# Patient Record
Sex: MALE | Race: WHITE | Employment: FULL TIME | URBAN - METROPOLITAN AREA
[De-identification: names, ages, dates, MRNs, and addresses within clinical notes are randomized per-mention and may not be internally consistent; named-entity substitution may affect disease eponyms.]

---

## 2024-08-19 ENCOUNTER — APPOINTMENT (OUTPATIENT)
Dept: CT IMAGING | Age: 69
DRG: 286 | End: 2024-08-19
Payer: MEDICARE

## 2024-08-19 ENCOUNTER — APPOINTMENT (OUTPATIENT)
Dept: GENERAL RADIOLOGY | Age: 69
DRG: 286 | End: 2024-08-19
Payer: MEDICARE

## 2024-08-19 ENCOUNTER — HOSPITAL ENCOUNTER (INPATIENT)
Age: 69
LOS: 2 days | Discharge: HOME | DRG: 286 | End: 2024-08-22
Attending: STUDENT IN AN ORGANIZED HEALTH CARE EDUCATION/TRAINING PROGRAM | Admitting: INTERNAL MEDICINE
Payer: MEDICARE

## 2024-08-19 DIAGNOSIS — I25.10 CAD (CORONARY ARTERY DISEASE): ICD-10-CM

## 2024-08-19 DIAGNOSIS — I50.9 ACUTE ON CHRONIC CONGESTIVE HEART FAILURE, UNSPECIFIED HEART FAILURE TYPE (HCC): Primary | ICD-10-CM

## 2024-08-19 DIAGNOSIS — I50.21 ACUTE SYSTOLIC CONGESTIVE HEART FAILURE (HCC): ICD-10-CM

## 2024-08-19 DIAGNOSIS — R06.02 SHORTNESS OF BREATH: ICD-10-CM

## 2024-08-19 LAB
ANION GAP SERPL CALCULATED.3IONS-SCNC: 18 MMOL/L (ref 3–16)
BASE EXCESS BLDV CALC-SCNC: -7.3 MMOL/L (ref -3–3)
BASOPHILS # BLD: 0.2 K/UL (ref 0–0.2)
BASOPHILS NFR BLD: 2.2 %
BUN SERPL-MCNC: 22 MG/DL (ref 7–20)
CALCIUM SERPL-MCNC: 8.7 MG/DL (ref 8.3–10.6)
CHLORIDE SERPL-SCNC: 96 MMOL/L (ref 99–110)
CO2 BLDV-SCNC: 39 MMOL/L
CO2 SERPL-SCNC: 15 MMOL/L (ref 21–32)
COHGB MFR BLDV: 7.5 % (ref 0–1.5)
CREAT SERPL-MCNC: 1.2 MG/DL (ref 0.8–1.3)
D-DIMER QUANTITATIVE: 2.13 UG/ML FEU (ref 0–0.6)
DEPRECATED RDW RBC AUTO: 15.1 % (ref 12.4–15.4)
EOSINOPHIL # BLD: 0.2 K/UL (ref 0–0.6)
EOSINOPHIL NFR BLD: 2 %
GFR SERPLBLD CREATININE-BSD FMLA CKD-EPI: 66 ML/MIN/{1.73_M2}
GLUCOSE SERPL-MCNC: 135 MG/DL (ref 70–99)
HCO3 BLDV-SCNC: 16.4 MMOL/L (ref 23–29)
HCT VFR BLD AUTO: 42.9 % (ref 40.5–52.5)
HGB BLD-MCNC: 13.9 G/DL (ref 13.5–17.5)
LYMPHOCYTES # BLD: 1.3 K/UL (ref 1–5.1)
LYMPHOCYTES NFR BLD: 15.1 %
MCH RBC QN AUTO: 27.3 PG (ref 26–34)
MCHC RBC AUTO-ENTMCNC: 32.4 G/DL (ref 31–36)
MCV RBC AUTO: 84.3 FL (ref 80–100)
METHGB MFR BLDV: 0.7 %
MONOCYTES # BLD: 0.9 K/UL (ref 0–1.3)
MONOCYTES NFR BLD: 10.6 %
NEUTROPHILS # BLD: 6 K/UL (ref 1.7–7.7)
NEUTROPHILS NFR BLD: 70.1 %
NT-PROBNP SERPL-MCNC: ABNORMAL PG/ML (ref 0–124)
O2 CT VFR BLDV CALC: 19 VOL %
O2 THERAPY: ABNORMAL
PCO2 BLDV: 28.1 MMHG (ref 40–50)
PH BLDV: 7.37 [PH] (ref 7.35–7.45)
PLATELET # BLD AUTO: 274 K/UL (ref 135–450)
PMV BLD AUTO: 8 FL (ref 5–10.5)
PO2 BLDV: 147 MMHG (ref 25–40)
POTASSIUM SERPL-SCNC: 4.7 MMOL/L (ref 3.5–5.1)
RBC # BLD AUTO: 5.09 M/UL (ref 4.2–5.9)
SAO2 % BLDV: 100 %
SODIUM SERPL-SCNC: 129 MMOL/L (ref 136–145)
TROPONIN, HIGH SENSITIVITY: 34 NG/L (ref 0–22)
WBC # BLD AUTO: 8.6 K/UL (ref 4–11)

## 2024-08-19 PROCEDURE — 83880 ASSAY OF NATRIURETIC PEPTIDE: CPT

## 2024-08-19 PROCEDURE — 96374 THER/PROPH/DIAG INJ IV PUSH: CPT

## 2024-08-19 PROCEDURE — 85025 COMPLETE CBC W/AUTO DIFF WBC: CPT

## 2024-08-19 PROCEDURE — 99285 EMERGENCY DEPT VISIT HI MDM: CPT

## 2024-08-19 PROCEDURE — 71046 X-RAY EXAM CHEST 2 VIEWS: CPT

## 2024-08-19 PROCEDURE — 82803 BLOOD GASES ANY COMBINATION: CPT

## 2024-08-19 PROCEDURE — 71260 CT THORAX DX C+: CPT

## 2024-08-19 PROCEDURE — 80048 BASIC METABOLIC PNL TOTAL CA: CPT

## 2024-08-19 PROCEDURE — 6360000004 HC RX CONTRAST MEDICATION: Performed by: STUDENT IN AN ORGANIZED HEALTH CARE EDUCATION/TRAINING PROGRAM

## 2024-08-19 PROCEDURE — 6360000002 HC RX W HCPCS: Performed by: STUDENT IN AN ORGANIZED HEALTH CARE EDUCATION/TRAINING PROGRAM

## 2024-08-19 PROCEDURE — 93005 ELECTROCARDIOGRAM TRACING: CPT | Performed by: EMERGENCY MEDICINE

## 2024-08-19 PROCEDURE — 84484 ASSAY OF TROPONIN QUANT: CPT

## 2024-08-19 PROCEDURE — 85379 FIBRIN DEGRADATION QUANT: CPT

## 2024-08-19 RX ORDER — IOPAMIDOL 755 MG/ML
75 INJECTION, SOLUTION INTRAVASCULAR
Status: COMPLETED | OUTPATIENT
Start: 2024-08-19 | End: 2024-08-19

## 2024-08-19 RX ORDER — FUROSEMIDE 10 MG/ML
40 INJECTION INTRAMUSCULAR; INTRAVENOUS ONCE
Status: COMPLETED | OUTPATIENT
Start: 2024-08-19 | End: 2024-08-19

## 2024-08-19 RX ADMIN — FUROSEMIDE 40 MG: 10 INJECTION, SOLUTION INTRAMUSCULAR; INTRAVENOUS at 23:01

## 2024-08-19 RX ADMIN — IOPAMIDOL 75 ML: 755 INJECTION, SOLUTION INTRAVENOUS at 23:56

## 2024-08-19 ASSESSMENT — LIFESTYLE VARIABLES
HOW OFTEN DO YOU HAVE A DRINK CONTAINING ALCOHOL: MONTHLY OR LESS
HOW MANY STANDARD DRINKS CONTAINING ALCOHOL DO YOU HAVE ON A TYPICAL DAY: 1 OR 2

## 2024-08-19 ASSESSMENT — PAIN - FUNCTIONAL ASSESSMENT: PAIN_FUNCTIONAL_ASSESSMENT: NONE - DENIES PAIN

## 2024-08-20 ENCOUNTER — APPOINTMENT (OUTPATIENT)
Age: 69
DRG: 286 | End: 2024-08-20
Attending: INTERNAL MEDICINE
Payer: MEDICARE

## 2024-08-20 PROBLEM — R06.02 SHORTNESS OF BREATH: Status: ACTIVE | Noted: 2024-08-20

## 2024-08-20 LAB
ANION GAP SERPL CALCULATED.3IONS-SCNC: 15 MMOL/L (ref 3–16)
BASOPHILS # BLD: 0 K/UL (ref 0–0.2)
BASOPHILS NFR BLD: 0.5 %
BUN SERPL-MCNC: 21 MG/DL (ref 7–20)
CALCIUM SERPL-MCNC: 8.5 MG/DL (ref 8.3–10.6)
CHLORIDE SERPL-SCNC: 99 MMOL/L (ref 99–110)
CO2 SERPL-SCNC: 20 MMOL/L (ref 21–32)
CREAT SERPL-MCNC: 1.3 MG/DL (ref 0.8–1.3)
DEPRECATED RDW RBC AUTO: 15.1 % (ref 12.4–15.4)
ECHO AO ASC DIAM: 3.6 CM
ECHO AO ASCENDING AORTA INDEX: 1.75 CM/M2
ECHO AO ROOT DIAM: 3.6 CM
ECHO AO ROOT INDEX: 1.75 CM/M2
ECHO AV AREA PEAK VELOCITY: 2.9 CM2
ECHO AV AREA VTI: 2.6 CM2
ECHO AV AREA/BSA PEAK VELOCITY: 1.4 CM2/M2
ECHO AV AREA/BSA VTI: 1.3 CM2/M2
ECHO AV MEAN GRADIENT: 1 MMHG
ECHO AV MEAN VELOCITY: 0.6 M/S
ECHO AV PEAK GRADIENT: 3 MMHG
ECHO AV PEAK VELOCITY: 0.8 M/S
ECHO AV VELOCITY RATIO: 0.88
ECHO AV VTI: 12.5 CM
ECHO BSA: 2.08 M2
ECHO EST RA PRESSURE: 15 MMHG
ECHO IVC PROX: 2.4 CM
ECHO LA AREA 2C: 27.3 CM2
ECHO LA AREA 4C: 28.1 CM2
ECHO LA DIAMETER INDEX: 2.33 CM/M2
ECHO LA DIAMETER: 4.8 CM
ECHO LA MAJOR AXIS: 6.6 CM
ECHO LA MINOR AXIS: 5.9 CM
ECHO LA TO AORTIC ROOT RATIO: 1.33
ECHO LA VOL BP: 103 ML (ref 18–58)
ECHO LA VOL MOD A2C: 102 ML (ref 18–58)
ECHO LA VOL MOD A4C: 94 ML (ref 18–58)
ECHO LA VOL/BSA BIPLANE: 50 ML/M2 (ref 16–34)
ECHO LA VOLUME INDEX MOD A2C: 50 ML/M2 (ref 16–34)
ECHO LA VOLUME INDEX MOD A4C: 46 ML/M2 (ref 16–34)
ECHO LV E' LATERAL VELOCITY: 15 CM/S
ECHO LV E' SEPTAL VELOCITY: 5 CM/S
ECHO LV EDV A2C: 156 ML
ECHO LV EDV A4C: 143 ML
ECHO LV EDV INDEX A4C: 69 ML/M2
ECHO LV EDV NDEX A2C: 76 ML/M2
ECHO LV EF PHYSICIAN: 25 %
ECHO LV EJECTION FRACTION A2C: 34 %
ECHO LV EJECTION FRACTION A4C: 22 %
ECHO LV EJECTION FRACTION BIPLANE: 26 % (ref 55–100)
ECHO LV ESV A2C: 104 ML
ECHO LV ESV A4C: 111 ML
ECHO LV ESV INDEX A2C: 50 ML/M2
ECHO LV ESV INDEX A4C: 54 ML/M2
ECHO LV FRACTIONAL SHORTENING: 9 % (ref 28–44)
ECHO LV INTERNAL DIMENSION DIASTOLE INDEX: 2.57 CM/M2
ECHO LV INTERNAL DIMENSION DIASTOLIC: 5.3 CM (ref 4.2–5.9)
ECHO LV INTERNAL DIMENSION SYSTOLIC INDEX: 2.33 CM/M2
ECHO LV INTERNAL DIMENSION SYSTOLIC: 4.8 CM
ECHO LV IVSD: 0.9 CM (ref 0.6–1)
ECHO LV MASS 2D: 187.3 G (ref 88–224)
ECHO LV MASS INDEX 2D: 90.9 G/M2 (ref 49–115)
ECHO LV POSTERIOR WALL DIASTOLIC: 1 CM (ref 0.6–1)
ECHO LV RELATIVE WALL THICKNESS RATIO: 0.38
ECHO LVOT AREA: 3.5 CM2
ECHO LVOT AV VTI INDEX: 0.74
ECHO LVOT DIAM: 2.1 CM
ECHO LVOT MEAN GRADIENT: 1 MMHG
ECHO LVOT PEAK GRADIENT: 2 MMHG
ECHO LVOT PEAK VELOCITY: 0.7 M/S
ECHO LVOT STROKE VOLUME INDEX: 15.5 ML/M2
ECHO LVOT SV: 31.8 ML
ECHO LVOT VTI: 9.2 CM
ECHO MV A VELOCITY: 0.39 M/S
ECHO MV E DECELERATION TIME (DT): 190 MS
ECHO MV E VELOCITY: 0.81 M/S
ECHO MV E/A RATIO: 2.08
ECHO MV E/E' LATERAL: 5.4
ECHO MV E/E' RATIO (AVERAGED): 10.8
ECHO MV E/E' SEPTAL: 16.2
ECHO MV REGURGITANT PEAK GRADIENT: 61 MMHG
ECHO MV REGURGITANT PEAK VELOCITY: 3.9 M/S
ECHO PV MAX VELOCITY: 0.8 M/S
ECHO PV PEAK GRADIENT: 2 MMHG
ECHO RA AREA 4C: 22.8 CM2
ECHO RA END SYSTOLIC VOLUME APICAL 4 CHAMBER INDEX BSA: 34 ML/M2
ECHO RA VOLUME: 70 ML
ECHO RIGHT VENTRICULAR SYSTOLIC PRESSURE (RVSP): 30 MMHG
ECHO RV BASAL DIMENSION: 3.6 CM
ECHO RV FREE WALL PEAK S': 8 CM/S
ECHO RV LONGITUDINAL DIMENSION: 9.6 CM
ECHO RV MID DIMENSION: 2.7 CM
ECHO RV TAPSE: 1.3 CM (ref 1.7–?)
ECHO TV REGURGITANT MAX VELOCITY: 1.92 M/S
ECHO TV REGURGITANT PEAK GRADIENT: 15 MMHG
EKG ATRIAL RATE: 106 BPM
EKG DIAGNOSIS: NORMAL
EKG P AXIS: 72 DEGREES
EKG P-R INTERVAL: 152 MS
EKG Q-T INTERVAL: 370 MS
EKG QRS DURATION: 104 MS
EKG QTC CALCULATION (BAZETT): 491 MS
EKG R AXIS: 15 DEGREES
EKG T AXIS: 111 DEGREES
EKG VENTRICULAR RATE: 106 BPM
EOSINOPHIL # BLD: 0.1 K/UL (ref 0–0.6)
EOSINOPHIL NFR BLD: 2.3 %
GFR SERPLBLD CREATININE-BSD FMLA CKD-EPI: 60 ML/MIN/{1.73_M2}
GLUCOSE SERPL-MCNC: 104 MG/DL (ref 70–99)
HCT VFR BLD AUTO: 40.9 % (ref 40.5–52.5)
HGB BLD-MCNC: 13 G/DL (ref 13.5–17.5)
LYMPHOCYTES # BLD: 1.3 K/UL (ref 1–5.1)
LYMPHOCYTES NFR BLD: 19.5 %
MCH RBC QN AUTO: 26.5 PG (ref 26–34)
MCHC RBC AUTO-ENTMCNC: 31.7 G/DL (ref 31–36)
MCV RBC AUTO: 83.6 FL (ref 80–100)
MONOCYTES # BLD: 0.7 K/UL (ref 0–1.3)
MONOCYTES NFR BLD: 11.2 %
NEUTROPHILS # BLD: 4.4 K/UL (ref 1.7–7.7)
NEUTROPHILS NFR BLD: 66.5 %
PLATELET # BLD AUTO: 220 K/UL (ref 135–450)
PMV BLD AUTO: 7.8 FL (ref 5–10.5)
POTASSIUM SERPL-SCNC: 4.1 MMOL/L (ref 3.5–5.1)
RBC # BLD AUTO: 4.89 M/UL (ref 4.2–5.9)
SODIUM SERPL-SCNC: 134 MMOL/L (ref 136–145)
TROPONIN, HIGH SENSITIVITY: 36 NG/L (ref 0–22)
WBC # BLD AUTO: 6.5 K/UL (ref 4–11)

## 2024-08-20 PROCEDURE — 80048 BASIC METABOLIC PNL TOTAL CA: CPT

## 2024-08-20 PROCEDURE — 1200000000 HC SEMI PRIVATE

## 2024-08-20 PROCEDURE — 93306 TTE W/DOPPLER COMPLETE: CPT | Performed by: INTERNAL MEDICINE

## 2024-08-20 PROCEDURE — C8929 TTE W OR WO FOL WCON,DOPPLER: HCPCS

## 2024-08-20 PROCEDURE — 2580000003 HC RX 258: Performed by: PHYSICIAN ASSISTANT

## 2024-08-20 PROCEDURE — 85025 COMPLETE CBC W/AUTO DIFF WBC: CPT

## 2024-08-20 PROCEDURE — 99223 1ST HOSP IP/OBS HIGH 75: CPT | Performed by: INTERNAL MEDICINE

## 2024-08-20 PROCEDURE — 6360000002 HC RX W HCPCS: Performed by: PHYSICIAN ASSISTANT

## 2024-08-20 PROCEDURE — 84484 ASSAY OF TROPONIN QUANT: CPT

## 2024-08-20 PROCEDURE — 6360000004 HC RX CONTRAST MEDICATION: Performed by: STUDENT IN AN ORGANIZED HEALTH CARE EDUCATION/TRAINING PROGRAM

## 2024-08-20 PROCEDURE — 36415 COLL VENOUS BLD VENIPUNCTURE: CPT

## 2024-08-20 PROCEDURE — 6370000000 HC RX 637 (ALT 250 FOR IP): Performed by: STUDENT IN AN ORGANIZED HEALTH CARE EDUCATION/TRAINING PROGRAM

## 2024-08-20 PROCEDURE — 6370000000 HC RX 637 (ALT 250 FOR IP): Performed by: INTERNAL MEDICINE

## 2024-08-20 PROCEDURE — 93010 ELECTROCARDIOGRAM REPORT: CPT | Performed by: INTERNAL MEDICINE

## 2024-08-20 RX ORDER — LISINOPRIL 10 MG/1
10 TABLET ORAL DAILY
Status: DISCONTINUED | OUTPATIENT
Start: 2024-08-20 | End: 2024-08-22 | Stop reason: HOSPADM

## 2024-08-20 RX ORDER — ACETAMINOPHEN 650 MG/1
650 SUPPOSITORY RECTAL EVERY 6 HOURS PRN
Status: DISCONTINUED | OUTPATIENT
Start: 2024-08-20 | End: 2024-08-22 | Stop reason: HOSPADM

## 2024-08-20 RX ORDER — CARVEDILOL 3.12 MG/1
3.12 TABLET ORAL 2 TIMES DAILY WITH MEALS
Status: DISCONTINUED | OUTPATIENT
Start: 2024-08-20 | End: 2024-08-22 | Stop reason: HOSPADM

## 2024-08-20 RX ORDER — ONDANSETRON 2 MG/ML
4 INJECTION INTRAMUSCULAR; INTRAVENOUS EVERY 6 HOURS PRN
Status: DISCONTINUED | OUTPATIENT
Start: 2024-08-20 | End: 2024-08-22 | Stop reason: HOSPADM

## 2024-08-20 RX ORDER — ACETAMINOPHEN 325 MG/1
650 TABLET ORAL EVERY 6 HOURS PRN
Status: DISCONTINUED | OUTPATIENT
Start: 2024-08-20 | End: 2024-08-21

## 2024-08-20 RX ORDER — SODIUM CHLORIDE 0.9 % (FLUSH) 0.9 %
10 SYRINGE (ML) INJECTION PRN
Status: DISCONTINUED | OUTPATIENT
Start: 2024-08-20 | End: 2024-08-22 | Stop reason: HOSPADM

## 2024-08-20 RX ORDER — LIDOCAINE 4 G/G
1 PATCH TOPICAL DAILY
Status: DISCONTINUED | OUTPATIENT
Start: 2024-08-20 | End: 2024-08-22 | Stop reason: HOSPADM

## 2024-08-20 RX ORDER — FUROSEMIDE 10 MG/ML
40 INJECTION INTRAMUSCULAR; INTRAVENOUS 2 TIMES DAILY
Status: DISCONTINUED | OUTPATIENT
Start: 2024-08-20 | End: 2024-08-22

## 2024-08-20 RX ORDER — SODIUM CHLORIDE 0.9 % (FLUSH) 0.9 %
5-40 SYRINGE (ML) INJECTION EVERY 12 HOURS SCHEDULED
Status: DISCONTINUED | OUTPATIENT
Start: 2024-08-20 | End: 2024-08-22 | Stop reason: HOSPADM

## 2024-08-20 RX ORDER — SODIUM CHLORIDE 9 MG/ML
INJECTION, SOLUTION INTRAVENOUS PRN
Status: DISCONTINUED | OUTPATIENT
Start: 2024-08-20 | End: 2024-08-22 | Stop reason: HOSPADM

## 2024-08-20 RX ORDER — ENOXAPARIN SODIUM 100 MG/ML
40 INJECTION SUBCUTANEOUS DAILY
Status: DISCONTINUED | OUTPATIENT
Start: 2024-08-20 | End: 2024-08-22 | Stop reason: HOSPADM

## 2024-08-20 RX ORDER — SENNOSIDES A AND B 8.6 MG/1
1 TABLET, FILM COATED ORAL DAILY PRN
Status: DISCONTINUED | OUTPATIENT
Start: 2024-08-20 | End: 2024-08-22 | Stop reason: HOSPADM

## 2024-08-20 RX ADMIN — CARVEDILOL 3.12 MG: 3.12 TABLET, FILM COATED ORAL at 17:19

## 2024-08-20 RX ADMIN — Medication 10 ML: at 10:16

## 2024-08-20 RX ADMIN — LISINOPRIL 10 MG: 10 TABLET ORAL at 12:12

## 2024-08-20 RX ADMIN — FUROSEMIDE 40 MG: 10 INJECTION, SOLUTION INTRAMUSCULAR; INTRAVENOUS at 17:19

## 2024-08-20 RX ADMIN — CARVEDILOL 3.12 MG: 3.12 TABLET, FILM COATED ORAL at 12:12

## 2024-08-20 RX ADMIN — FUROSEMIDE 40 MG: 10 INJECTION, SOLUTION INTRAMUSCULAR; INTRAVENOUS at 10:12

## 2024-08-20 RX ADMIN — Medication 10 ML: at 20:27

## 2024-08-20 RX ADMIN — ENOXAPARIN SODIUM 40 MG: 100 INJECTION SUBCUTANEOUS at 10:12

## 2024-08-20 RX ADMIN — PERFLUTREN 1.5 ML: 6.52 INJECTION, SUSPENSION INTRAVENOUS at 13:30

## 2024-08-20 ASSESSMENT — PAIN SCALES - GENERAL
PAINLEVEL_OUTOF10: 0

## 2024-08-20 ASSESSMENT — PAIN DESCRIPTION - LOCATION: LOCATION: NECK

## 2024-08-20 NOTE — ED NOTES
ED TO INPATIENT SBAR HANDOFF    Patient Name: Amado Garcia   :  1955  68 y.o.   MRN:  [id number]  Preferred Name  amado  ED Room #:  ED-0001/01  Family/Caregiver Present yes   Restraints no   Sitter no   Sepsis Risk Score      Situation  Code Status: Prior No additional code details.    Allergies: Penicillins  Weight: Patient Vitals for the past 96 hrs (Last 3 readings):   Weight   24 2237 86.2 kg (190 lb)     Arrived from: home  Chief Complaint:   Chief Complaint   Patient presents with    Shortness of Breath     Pt arrived [place] [facility] from home. Pt called ems due to feeling short of breath, more tired, and discoloration in his feet. When ems arrived they noticed bilateral leg swelling and discoloration along with shortness of breath. Pt denies chest pain pt denies cardiac history just a heart murmur when he was a child.      Hospital Problem/Diagnosis:  Active Problems:    * No active hospital problems. *  Resolved Problems:    * No resolved hospital problems. *    Imaging:   XR CHEST (2 VW)   Final Result   Left basilar atelectasis.         CT CHEST PULMONARY EMBOLISM W CONTRAST    (Results Pending)     Abnormal labs:   Abnormal Labs Reviewed   BASIC METABOLIC PANEL - Abnormal; Notable for the following components:       Result Value    Sodium 129 (*)     Chloride 96 (*)     CO2 15 (*)     Anion Gap 18 (*)     Glucose 135 (*)     BUN 22 (*)     All other components within normal limits   TROPONIN - Abnormal; Notable for the following components:    Troponin, High Sensitivity 34 (*)     All other components within normal limits   BRAIN NATRIURETIC PEPTIDE - Abnormal; Notable for the following components:    Pro-BNP 16,528 (*)     All other components within normal limits   D-DIMER, QUANTITATIVE - Abnormal; Notable for the following components:    D-Dimer, Quant 2.13 (*)     All other components within normal limits   BLOOD GAS, VENOUS - Abnormal; Notable for the following components:    pCO2, Robin

## 2024-08-20 NOTE — ED PROVIDER NOTES
EMERGENCY DEPARTMENT PROVIDER NOTE      PATIENT IDENTIFICATION  Name:   Ariel Garcia  MRN:   [id number]  YOB: 1955  Date of Evaluation:   8/19/2024  Provider:   Dimas Vee DO  PCP:   Alina Ponce MD        CHIEF COMPLAINT:   Shortness of Breath (Pt arrived [facility] from home. Pt called ems due to feeling short of breath, more tired, and discoloration in his feet. When ems arrived they noticed bilateral leg swelling and discoloration along with shortness of breath. Pt denies chest pain pt denies cardiac history just a heart murmur when he was a child. )      HPI  Ariel Garcia is a(n) 68 y.o. male with past medical history as below including hypertension  who arrives via EMS for dyspnea.  States that it is made really worsening over the last 2 weeks or so, but tonight when he laid down to go to bed it got significantly worse.  He affirms gradually worsening bilateral lower extremity edema.  The patient denies fever, chills, recent illness, headache, rash, chest pain, cough, abdominal pain, nausea, vomiting, change in bowel movements, and urinary symptoms.  He denies history of similar symptoms.  He states he has never been seen by a cardiologist.  He states he has not taken any medication for his current symptoms.      I personally reviewed the following nurse documentation:  Past Medical History:     Past Medical History:   Diagnosis Date    JERO (Hamm's esophagus)     Ganglion cyst     Heart murmur     Hypertension     Scoliosis      Home Medications:     Previous Medications    ASPIRIN 325 MG TABLET    Take 325 mg by mouth daily.      ESOMEPRAZOLE (NEXIUM) 40 MG CAPSULE    Take 40 mg by mouth every morning (before breakfast).       Allergies:   Penicillins  Past Surgical History:     Past Surgical History:   Procedure Laterality Date    UPPER GASTROINTESTINAL ENDOSCOPY  6-1-2011    with biopsy     Social History:    reports that he has been smoking cigarettes. He has a  a child. )   as described above.  History obtained from patient and EMS team/run sheet.  I reviewed patient's prior medical history and discharge note.  Vital signs reviewed.  Slight tachycardia, consistent with acute distress.  Will continue to monitor.  Blood pressure also elevated at 151/114.  Patient  and mild distress .  Physical exam as above.  Rales bilateral lower lung fields as well as symmetric lower extremity edema.    History and physical exam consistent with differential including, but not limited to:   Congestive heart failure, ACS, and pneumothorax.  Low clinical suspicion for pulmonary embolism in this patient, as clinically he has evidence of fluid overload and this is much more likely the diagnosis at this time.  Patient will be evaluated with blood work, EKG, ultrasound, and chest x-ray.  Considered BiPAP, but patient's oxygen saturation is 98%.  Will continue to monitor.    I independently interpreted EKG (see full interpretation above), lab work (per ED course), and imaging (per ED course).    Procedure(s) performed per procedure section above.    Critical Care  Upon my evaluation, this patient had a high probability of imminent or life-threatening deterioration due to CHF exacerbation which required my direct attention, intervention, and personal management.    The total critical care time personally spent while evaluating and treating this patient was 35 minutes exclusive of any time spent doing separately billable procedures.  This includes time at the bedside, data interpretation, medication management, monitoring for potential decompensation and physician consultation.  Specifics of interventions taken and potentially life-threatening diagnostic considerations are listed above in the medical decision making.    ED Course as of 08/20/24 0022   Mon Aug 19, 2024   2230 EKG 12 Lead  Nonischemic [PB]   2240 Echocardiogram with significantly reduced EF and plethoric IVC.  Consistent with heart

## 2024-08-20 NOTE — H&P
input(s): \"AST\", \"ALT\", \"BILITOT\", \"ALKPHOS\" in the last 72 hours.    Invalid input(s): \"ALB\"  Lipids: No results found for: \"CHOL\", \"HDL\", \"TRIG\"  Hemoglobin A1C: No results found for: \"LABA1C\"  TSH: No results found for: \"TSH\"  Troponin: No results found for: \"TROPONINT\"  Lactic Acid: No results for input(s): \"LACTA\" in the last 72 hours.  BNP:   Recent Labs     08/19/24  2241   PROBNP 16,528*     UA:No results found for: \"NITRU\", \"COLORU\", \"PHUR\", \"LABCAST\", \"WBCUA\", \"RBCUA\", \"MUCUS\", \"TRICHOMONAS\", \"YEAST\", \"BACTERIA\", \"CLARITYU\", \"SPECGRAV\", \"LEUKOCYTESUR\", \"UROBILINOGEN\", \"BILIRUBINUR\", \"BLOODU\", \"GLUCOSEU\", \"KETUA\", \"AMORPHOUS\"  Urine Cultures: No results found for: \"LABURIN\"  Blood Cultures: No results found for: \"BC\"  No results found for: \"BLOODCULT2\"  Organism: No results found for: \"ORG\"    IMAGING/DIAGNOSTICS LAST 24 HOURS    CT CHEST PULMONARY EMBOLISM W CONTRAST    Result Date: 8/20/2024  EXAMINATION: CTA OF THE CHEST 8/19/2024 10:56 pm TECHNIQUE: CTA of the chest was performed after the administration of intravenous contrast.  Multiplanar reformatted images are provided for review.  MIP images are provided for review. Automated exposure control, iterative reconstruction, and/or weight based adjustment of the mA/kV was utilized to reduce the radiation dose to as low as reasonably achievable. COMPARISON: None. HISTORY: ORDERING SYSTEM PROVIDED HISTORY: Elevated ddimer, dyspnea TECHNOLOGIST PROVIDED HISTORY: Reason for exam:->Elevated ddimer, dyspnea Additional Contrast?->1 FINDINGS: Pulmonary Arteries: Pulmonary arteries are adequately opacified for evaluation.  No evidence of intraluminal filling defect to suggest pulmonary embolism.  Main pulmonary artery is normal in caliber. Mediastinum: No evidence of mediastinal lymphadenopathy.  The heart and pericardium demonstrate no acute abnormality.  There is no acute abnormality of the thoracic aorta. Lungs/pleura: Moderate right pleural effusion.  Small  left pleural effusion. Interstitial thickening in both lungs suggesting pulmonary edema. Upper Abdomen: Limited images of the upper abdomen are unremarkable. Soft Tissues/Bones: No acute bone or soft tissue abnormality.     1. No evidence of pulmonary embolism. 2. Moderate right pleural effusion. Small left pleural effusion. 3. Mild pulmonary edema.     XR CHEST (2 VW)    Result Date: 8/19/2024  EXAMINATION: TWO XRAY VIEWS OF THE CHEST 8/19/2024 10:58 pm COMPARISON: None. HISTORY: ORDERING SYSTEM PROVIDED HISTORY: CHF TECHNOLOGIST PROVIDED HISTORY: Reason for exam:->CHF Reason for Exam: Shortness of Breath FINDINGS: The cardiomediastinal silhouette is normal size.  There is left basilar atelectasis.  No consolidation or venous congestion.No pleural effusion or pneumothorax identified.     Left basilar atelectasis.       Total time spent caring for the patient today was 75 minutes. This includes time spent before the visit reviewing the chart, time spent during the visit, and time spent after the visit on documentation.    (Please note that portions of this note were completed with a voice recognition program.  Efforts were made to edit the dictations but occasionally words are mis-transcribed.)     Dalila Herrera PA-C

## 2024-08-20 NOTE — CONSULTS
[place] [address]                              CONSULTATION      PATIENT NAME: TRICIA GUTIERREZ                 : 1955  MED REC NO: [id number]                      ROOM: [place] [address]  ACCOUNT NO: [id number]                       ADMIT DATE: 2024  PROVIDER: Michael Fu MD      CONSULT DATE: 2024    REASON FOR CONSULTATION:  Evaluate patient with acute congestive heart failure at the request of the Hospitalist Service.    HISTORY OF PRESENT ILLNESS:  The patient is a 68-year-old gentleman who generally does not seek medical attention.  He was evaluated by me in .  At that time, he underwent a cardiac catheterization for chest pain.  On presentation, he had a moderate LAD lesion and moderate diagonal lesion.  He was advised to follow up regularly with me, but never sought medical attention after that.  He has history of hypertension, likely untreated for many years.  He was a regular cigarette user but has quit for sometime.  He drinks alcohol in the past.  He states that he generally feels well and is very active, taking care of his home.  Also, he has been having to deal with his wife being quite ill with medical problems.  He states that in April, he had about a 3- to 4-day episode where he was extremely short of breath, he was experiencing discomfort in his chest.  At that time, he did not seek medical attention.  Since that time, he has been progressively more short of breath and now he has noted marked shortness of breath as well as lower extremity swelling.  For that reason, he came to the emergency room.  Chest x-ray and CT scan were consistent with acute congestive heart failure.  Lab work showed his proBNP to be 16,000 with troponin high sensitivity being 34.  He also was noted to be hypertensive.  He was brought into the hospital, started on Lasix by the Hospitalist Service.  The patient states that he does

## 2024-08-20 NOTE — ED NOTES
How does patient ambulate?   []Low Fall Risk (ambulates by themselves without support)  [x]Stand by assist   []Contact Guard   []Front wheel walker  []Wheelchair   []Steady  []Bed bound  []History of Lower Extremity Amputation  []Unknown, did not assess in the emergency department   How does patient take pills?  []Whole with Water  []Crushed in applesauce  []Crushed in pudding  []Other  [x]Unknown no oral medications were given in the ED  Is patient alert?   [x]Alert  []Drowsy but responds to voice  []Doesn't respond to voice but responds to painful stimuli  []Unresponsive  Is patient oriented?   [x]To person  [x]To place  [x]To time  [x]To situation  []Confused  []Agitated  [x]Follows commands  If patient is disoriented or from a Skill Nursing Facility has family been notified of admission?   []Yes   []No  Patient belongings?   []Cell phone  []Wallet   []Dentures  [x]Clothing  Any specific patient or family belongings/needs/dynamics?   Family at bedside  Miscellaneous comments/pending orders?  See admit orders      If there are any additional questions please reach out to the Emergency Department.

## 2024-08-20 NOTE — PROGRESS NOTES
Acute systolic heart failure  Untreated hypertension with hypertensive CV disease    By history likely had an MI 4/24    Known CAD but he never sought cardiology f/up    Rec- echo,diuresis  Likely will need cardiac cath

## 2024-08-20 NOTE — PROGRESS NOTES
Pt admitted to 5917. Assessment complete. Reviewed plan of care with patient and answer all questions. Pt told he can have nothing by mouth. Instructed the pt to use urinal. Safety measures in place.

## 2024-08-20 NOTE — CONSULTS
CHF Nutrition Education    Consult received for heart failure diet education.  Education deferred or not appropriate at this time related to initial education to be completed by CHF Nurse Navigator.      Electronically signed by Sadie Asher MS, RD, LD on 8/20/2024 at 12:27 PM   Contact: [phone]

## 2024-08-21 LAB
ANION GAP SERPL CALCULATED.3IONS-SCNC: 12 MMOL/L (ref 3–16)
BASOPHILS # BLD: 0 K/UL (ref 0–0.2)
BASOPHILS NFR BLD: 0.5 %
BUN SERPL-MCNC: 28 MG/DL (ref 7–20)
CALCIUM SERPL-MCNC: 8.5 MG/DL (ref 8.3–10.6)
CHLORIDE SERPL-SCNC: 103 MMOL/L (ref 99–110)
CHOLEST SERPL-MCNC: 106 MG/DL (ref 0–199)
CO2 SERPL-SCNC: 21 MMOL/L (ref 21–32)
CREAT SERPL-MCNC: 1.5 MG/DL (ref 0.8–1.3)
DEPRECATED RDW RBC AUTO: 15.1 % (ref 12.4–15.4)
ECHO BSA: 2.08 M2
EOSINOPHIL # BLD: 0.3 K/UL (ref 0–0.6)
EOSINOPHIL NFR BLD: 4.4 %
GFR SERPLBLD CREATININE-BSD FMLA CKD-EPI: 50 ML/MIN/{1.73_M2}
GLUCOSE SERPL-MCNC: 131 MG/DL (ref 70–99)
HCT VFR BLD AUTO: 38.4 % (ref 40.5–52.5)
HDLC SERPL-MCNC: 21 MG/DL (ref 40–60)
HGB BLD-MCNC: 12.6 G/DL (ref 13.5–17.5)
LDLC SERPL CALC-MCNC: 65 MG/DL
LYMPHOCYTES # BLD: 1.4 K/UL (ref 1–5.1)
LYMPHOCYTES NFR BLD: 18.8 %
MAGNESIUM SERPL-MCNC: 2.3 MG/DL (ref 1.8–2.4)
MCH RBC QN AUTO: 27.2 PG (ref 26–34)
MCHC RBC AUTO-ENTMCNC: 32.7 G/DL (ref 31–36)
MCV RBC AUTO: 83 FL (ref 80–100)
MONOCYTES # BLD: 0.9 K/UL (ref 0–1.3)
MONOCYTES NFR BLD: 11.6 %
NEUTROPHILS # BLD: 4.8 K/UL (ref 1.7–7.7)
NEUTROPHILS NFR BLD: 64.7 %
PLATELET # BLD AUTO: 216 K/UL (ref 135–450)
PMV BLD AUTO: 8 FL (ref 5–10.5)
POTASSIUM SERPL-SCNC: 3.9 MMOL/L (ref 3.5–5.1)
RBC # BLD AUTO: 4.62 M/UL (ref 4.2–5.9)
SODIUM SERPL-SCNC: 136 MMOL/L (ref 136–145)
TRIGL SERPL-MCNC: 101 MG/DL (ref 0–150)
TROPONIN, HIGH SENSITIVITY: 49 NG/L (ref 0–22)
VLDLC SERPL CALC-MCNC: 20 MG/DL
WBC # BLD AUTO: 7.4 K/UL (ref 4–11)

## 2024-08-21 PROCEDURE — C1769 GUIDE WIRE: HCPCS | Performed by: INTERNAL MEDICINE

## 2024-08-21 PROCEDURE — 2580000003 HC RX 258: Performed by: INTERNAL MEDICINE

## 2024-08-21 PROCEDURE — 4A023N7 MEASUREMENT OF CARDIAC SAMPLING AND PRESSURE, LEFT HEART, PERCUTANEOUS APPROACH: ICD-10-PCS | Performed by: INTERNAL MEDICINE

## 2024-08-21 PROCEDURE — 80048 BASIC METABOLIC PNL TOTAL CA: CPT

## 2024-08-21 PROCEDURE — 2500000003 HC RX 250 WO HCPCS: Performed by: INTERNAL MEDICINE

## 2024-08-21 PROCEDURE — 6360000002 HC RX W HCPCS: Performed by: INTERNAL MEDICINE

## 2024-08-21 PROCEDURE — 2580000003 HC RX 258: Performed by: PHYSICIAN ASSISTANT

## 2024-08-21 PROCEDURE — 6360000004 HC RX CONTRAST MEDICATION: Performed by: INTERNAL MEDICINE

## 2024-08-21 PROCEDURE — 6370000000 HC RX 637 (ALT 250 FOR IP): Performed by: INTERNAL MEDICINE

## 2024-08-21 PROCEDURE — 85025 COMPLETE CBC W/AUTO DIFF WBC: CPT

## 2024-08-21 PROCEDURE — B2111ZZ FLUOROSCOPY OF MULTIPLE CORONARY ARTERIES USING LOW OSMOLAR CONTRAST: ICD-10-PCS | Performed by: INTERNAL MEDICINE

## 2024-08-21 PROCEDURE — 99152 MOD SED SAME PHYS/QHP 5/>YRS: CPT | Performed by: INTERNAL MEDICINE

## 2024-08-21 PROCEDURE — 6360000002 HC RX W HCPCS: Performed by: PHYSICIAN ASSISTANT

## 2024-08-21 PROCEDURE — 36415 COLL VENOUS BLD VENIPUNCTURE: CPT

## 2024-08-21 PROCEDURE — 99153 MOD SED SAME PHYS/QHP EA: CPT | Performed by: INTERNAL MEDICINE

## 2024-08-21 PROCEDURE — 93458 L HRT ARTERY/VENTRICLE ANGIO: CPT | Performed by: INTERNAL MEDICINE

## 2024-08-21 PROCEDURE — 6370000000 HC RX 637 (ALT 250 FOR IP): Performed by: STUDENT IN AN ORGANIZED HEALTH CARE EDUCATION/TRAINING PROGRAM

## 2024-08-21 PROCEDURE — C1894 INTRO/SHEATH, NON-LASER: HCPCS | Performed by: INTERNAL MEDICINE

## 2024-08-21 PROCEDURE — 84484 ASSAY OF TROPONIN QUANT: CPT

## 2024-08-21 PROCEDURE — 2709999900 HC NON-CHARGEABLE SUPPLY: Performed by: INTERNAL MEDICINE

## 2024-08-21 PROCEDURE — 80061 LIPID PANEL: CPT

## 2024-08-21 PROCEDURE — 1200000000 HC SEMI PRIVATE

## 2024-08-21 PROCEDURE — 83735 ASSAY OF MAGNESIUM: CPT

## 2024-08-21 PROCEDURE — C1760 CLOSURE DEV, VASC: HCPCS | Performed by: INTERNAL MEDICINE

## 2024-08-21 RX ORDER — SODIUM CHLORIDE 0.9 % (FLUSH) 0.9 %
5-40 SYRINGE (ML) INJECTION EVERY 12 HOURS SCHEDULED
Status: DISCONTINUED | OUTPATIENT
Start: 2024-08-21 | End: 2024-08-22 | Stop reason: HOSPADM

## 2024-08-21 RX ORDER — ATORVASTATIN CALCIUM 80 MG/1
80 TABLET, FILM COATED ORAL NIGHTLY
Status: DISCONTINUED | OUTPATIENT
Start: 2024-08-21 | End: 2024-08-22 | Stop reason: HOSPADM

## 2024-08-21 RX ORDER — LIDOCAINE HYDROCHLORIDE 10 MG/ML
INJECTION, SOLUTION INFILTRATION; PERINEURAL PRN
Status: DISCONTINUED | OUTPATIENT
Start: 2024-08-21 | End: 2024-08-21 | Stop reason: HOSPADM

## 2024-08-21 RX ORDER — OXYCODONE AND ACETAMINOPHEN 5; 325 MG/1; MG/1
2 TABLET ORAL EVERY 4 HOURS PRN
Status: DISCONTINUED | OUTPATIENT
Start: 2024-08-21 | End: 2024-08-22 | Stop reason: HOSPADM

## 2024-08-21 RX ORDER — SODIUM CHLORIDE 0.9 % (FLUSH) 0.9 %
5-40 SYRINGE (ML) INJECTION PRN
Status: DISCONTINUED | OUTPATIENT
Start: 2024-08-21 | End: 2024-08-22 | Stop reason: HOSPADM

## 2024-08-21 RX ORDER — ACETAMINOPHEN 325 MG/1
650 TABLET ORAL EVERY 4 HOURS PRN
Status: DISCONTINUED | OUTPATIENT
Start: 2024-08-21 | End: 2024-08-22 | Stop reason: HOSPADM

## 2024-08-21 RX ORDER — OXYCODONE AND ACETAMINOPHEN 5; 325 MG/1; MG/1
1 TABLET ORAL EVERY 4 HOURS PRN
Status: DISCONTINUED | OUTPATIENT
Start: 2024-08-21 | End: 2024-08-22 | Stop reason: HOSPADM

## 2024-08-21 RX ORDER — FENTANYL CITRATE 50 UG/ML
INJECTION, SOLUTION INTRAMUSCULAR; INTRAVENOUS PRN
Status: DISCONTINUED | OUTPATIENT
Start: 2024-08-21 | End: 2024-08-21 | Stop reason: HOSPADM

## 2024-08-21 RX ORDER — FUROSEMIDE 10 MG/ML
INJECTION INTRAMUSCULAR; INTRAVENOUS PRN
Status: DISCONTINUED | OUTPATIENT
Start: 2024-08-21 | End: 2024-08-21 | Stop reason: HOSPADM

## 2024-08-21 RX ORDER — SODIUM CHLORIDE 9 MG/ML
INJECTION, SOLUTION INTRAVENOUS PRN
Status: DISCONTINUED | OUTPATIENT
Start: 2024-08-21 | End: 2024-08-22 | Stop reason: HOSPADM

## 2024-08-21 RX ORDER — ASPIRIN 81 MG/1
81 TABLET ORAL DAILY
Status: DISCONTINUED | OUTPATIENT
Start: 2024-08-21 | End: 2024-08-22 | Stop reason: HOSPADM

## 2024-08-21 RX ORDER — IOPAMIDOL 755 MG/ML
INJECTION, SOLUTION INTRAVASCULAR PRN
Status: DISCONTINUED | OUTPATIENT
Start: 2024-08-21 | End: 2024-08-21 | Stop reason: HOSPADM

## 2024-08-21 RX ORDER — MIDAZOLAM HYDROCHLORIDE 1 MG/ML
INJECTION INTRAMUSCULAR; INTRAVENOUS PRN
Status: DISCONTINUED | OUTPATIENT
Start: 2024-08-21 | End: 2024-08-21 | Stop reason: HOSPADM

## 2024-08-21 RX ADMIN — LISINOPRIL 10 MG: 10 TABLET ORAL at 08:06

## 2024-08-21 RX ADMIN — CARVEDILOL 3.12 MG: 3.12 TABLET, FILM COATED ORAL at 17:41

## 2024-08-21 RX ADMIN — Medication 10 ML: at 08:04

## 2024-08-21 RX ADMIN — FUROSEMIDE 40 MG: 10 INJECTION, SOLUTION INTRAMUSCULAR; INTRAVENOUS at 17:41

## 2024-08-21 RX ADMIN — CARVEDILOL 3.12 MG: 3.12 TABLET, FILM COATED ORAL at 08:06

## 2024-08-21 RX ADMIN — ATORVASTATIN CALCIUM 80 MG: 80 TABLET, FILM COATED ORAL at 21:36

## 2024-08-21 RX ADMIN — ASPIRIN 81 MG: 81 TABLET, COATED ORAL at 13:12

## 2024-08-21 RX ADMIN — SODIUM CHLORIDE, PRESERVATIVE FREE 10 ML: 5 INJECTION INTRAVENOUS at 13:12

## 2024-08-21 ASSESSMENT — ENCOUNTER SYMPTOMS
APNEA: 0
SORE THROAT: 0
EYE ITCHING: 0
TROUBLE SWALLOWING: 0
EYE DISCHARGE: 0
ABDOMINAL DISTENTION: 0
BACK PAIN: 0
COLOR CHANGE: 1
ABDOMINAL PAIN: 0
SHORTNESS OF BREATH: 1
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
## 2024-08-21 NOTE — CONSULTS
Consultation H&P    Date of Admission:  8/19/2024 10:27 PM  Date of Consultation:  8/21/2024    PCP:  Alina Ponce MD      Cards: Dr Corral       Chief Complaint: Shortness of breath, tiredness, swelling in both legs    History of Present Illness:      Ariel Garcia is a 68 y.o. male with significant diagnosed past medical history of CAD with no follow up, Diabetes- HgbA1c of 8.2 in 2021 with no follow up, HTN-  not taking medications, HLD, who presents to the emergency department on 8/19 with complaints of shortness of breath that has been worsening since April but has worsened more over the last 2 weeks, swelling in both legs/feet with the same timeline. The workup in the emergency department revealed BNP of 16,528, Troponin of 34, 36 and 49, was found to be in heart failure, admitted to the hospital for management and workup.  Left heart angiogram found severe multivessel coronary artery disease. CVTS was consulted by Dr Corral for surgical evaluation for CABG.     Patient is currently hemodynamically stable and denying any complaints of chest pain or shortness of breath.   Patient is a previous smoker, stopped in 2001.    Patient lives at home with his wife.     Past Medical History:  Past Medical History:   Diagnosis Date    JERO (Hamm's esophagus)     Ganglion cyst     Heart murmur     Hypertension     Scoliosis        Past Surgical History:  Past Surgical History:   Procedure Laterality Date    UPPER GASTROINTESTINAL ENDOSCOPY  6-1-2011    with biopsy       Home Medications:   Prior to Admission medications    Medication Sig Start Date End Date Taking? Authorizing Provider   esomeprazole (NEXIUM) 40 MG capsule Take 1 capsule by mouth every morning (before breakfast)   Yes Flaco Marquez MD   aspirin 325 MG tablet Take 325 mg by mouth daily.    Patient not taking: Reported on 8/20/2024    Flaco Marquez MD        Facility Administered Medications:    sodium  file   Housing Stability: Low Risk  (8/20/2024)    Housing Stability Vital Sign     Unable to Pay for Housing in the Last Year: No     Number of Times Moved in the Last Year: 1     Homeless in the Last Year: No       Family History:      Problem Relation Age of Onset    High Cholesterol Mother     High Blood Pressure Mother     Diabetes Mother     Heart Disease Mother     Vision Loss Mother     Diabetes Father     Cancer Father     Hearing Loss Father     Vision Loss Father          Review of Systems   Constitutional:  Positive for activity change. Negative for appetite change.   HENT:  Negative for congestion, sore throat and trouble swallowing.    Eyes:  Negative for discharge and itching.   Respiratory:  Positive for shortness of breath. Negative for apnea.    Cardiovascular:  Positive for chest pain and leg swelling.   Gastrointestinal:  Negative for abdominal distention and abdominal pain.   Endocrine: Positive for heat intolerance. Negative for cold intolerance.   Genitourinary:  Negative for difficulty urinating and dysuria.   Musculoskeletal:  Negative for arthralgias, back pain and neck pain.   Skin:  Positive for color change. Negative for pallor.   Neurological:  Negative for dizziness, light-headedness and headaches.           Physical Examination:    BP (!) 113/92   Pulse 77   Temp 97.7 °F (36.5 °C) (Temporal)   Resp 16   Ht 1.803 m (5' 11\")   Wt 89.9 kg (198 lb 1.6 oz)   SpO2 99%   BMI 27.63 kg/m²      Admission Weight - Scale: 86.2 kg (190 lb)       General appearance: NAD, well nourished  Eyes: anicteric, PERRLA  ENMT: no scars or lesions, no nasal deformity  Neck: no masses, no JVD.  Respiratory: effort is unlabored, symmetric, no crackles, wheezes or rubs. No palpable/percussable abnormalities.  Cardiovascular: regular, no murmur. No edema or varicosities.   GI: abdomen soft, nondistended, No masses.  Musculoskeletal: strength and tone normal. Full ROM.   Extremities: warm and pink.   Skin:  answered.  Currently free of any complaints or symptoms.    Agree with above. Films reviewed and patient does not have targets for complete revascularization surgically and probably would be very challenging for PCI. He was only on aspirin 325 mg as outpatient and has a history on noncompliance. In any case, patient currently refusing any surgical intervention with which we agree.    Electronically signed by RAMONA Jc CNP on 8/21/2024 at 11:04 AM  Office Phone: [phone]

## 2024-08-21 NOTE — PROGRESS NOTES
Date of Admission: 8/19/2024. Hospital Day: 3       HOSPITAL COURSE  68 M admitted with new onset chf and found to have  MVD on [facility]      Interval  History:   Report good improvement in swelling and dyspnea.       Physical Exam     BP (!) 123/100   Pulse 84   Temp 97.7 °F (36.5 °C) (Temporal)   Resp 16   Ht 1.803 m (5' 11\")   Wt 89.9 kg (198 lb 1.6 oz)   SpO2 98%   BMI 27.63 kg/m²     General appearance: No apparent distress, appears stated age and cooperative.  Respiratory:  Normal respiratory effort. Clear to auscultation, bilaterally without Rales/Wheezes/Rhonchi.  Cardiovascular: Regular rate and rhythm with normal S1/S2 without murmurs, rubs or gallops.  Skin : 2 +  leg edema          Assessment/Plan:  # acute systolic HF/Ischhemic cardiomyopatht  EF  ` 25 %, sev gobal hypokinesis  Cont diuresis with iv lasix  , monitor I/O and BMP daily.  Chf, protocol.    Fluid restriction 2 L  Cont correg,  start jardiacne and  entresto      #MV CAD  [facility]  8/21  show multivessel cad. CT sx consulted. Plan for  graded PCI vs  CABG  Cont asa, lipitor, correg    # HTN  BP improving. Cont correg        Active Hospital Problems    Diagnosis     Shortness of breath [R06.02]     CAD (coronary artery disease) [I25.10]            DVT Prophylaxis:    Diet: ADULT DIET; Regular; Low Fat/Low Chol/High Fiber/2 gm Na  Code Status: Full Code      Dispo - home .  Expected DC  in/on  1-2 d   Barrier to discharge           Chief Complaint:   Chief Complaint   Patient presents with    Shortness of Breath     Pt arrived [place] [facility] from home. Pt called ems due to feeling short of breath, more tired, and discoloration in his feet. When ems arrived they noticed bilateral leg swelling and discoloration along with shortness of breath. Pt denies chest pain pt denies cardiac history just a heart murmur when he was a child.              Medications:  Reviewed    Infusion Medications    sodium chloride      sodium chloride      sodium  HOSPITALIST  IP CONSULT TO CARDIOLOGY  IP CONSULT TO HEART FAILURE NURSE/COORDINATOR  IP CONSULT TO DIETITIAN  IP CONSULT TO CARDIOTHORACIC SURGERY      Gamaliel Edwards MD

## 2024-08-21 NOTE — SEDATION DOCUMENTATION
Brief Pre-Op Note/Sedation Assessment      Ariel Garcia  1955  [id number]  9:24 AM    Planned Procedure: Cardiac Catheterization Procedure  Post Procedure Plan: Return to same level of care  Consent: I have discussed with the patient and/or the patient representative the indication, alternatives, and the possible risks and/or complications of the planned procedure and the anesthesia methods. The patient and/or patient representative appear to understand and agree to proceed.        Chief Complaint:   Dyspnea on Exertion  Dyspnea      Indications for Cath Procedure:  Presentation:  Cardiomyopathy and LV Dysfunction  2.  Anginal Classification within 2 weeks:  No symptoms  3.  Angina Symptoms Assessment:  Asymptomatic  4.  Heart Failure Class within last 2 weeks:  Yes:  Heart Failure Type: Systolic Severity:  Class IV - Symptoms of HF at rest  5.  Cardiovascular Instability:  Yes:  Acute CHF symptoms    Prior Ischemic Workup/Eval:  Pre-Procedural Medications: Yes: ACE/ARB/ARNI, Aspirin, Beta Blockers, and STATIN  2.   Stress Test Completed?  No    Does Patient need surgery?  Cath Valve Surgery:  No    Pre-Procedure Medical History:  Vital Signs:  BP (!) 108/90   Pulse 79   Temp 97.7 °F (36.5 °C) (Temporal)   Resp 18   Ht 1.803 m (5' 11\")   Wt 89.9 kg (198 lb 1.6 oz)   SpO2 95%   BMI 27.63 kg/m²     Allergies:    Allergies   Allergen Reactions    Penicillins      Medications:    Current Facility-Administered Medications   Medication Dose Route Frequency Provider Last Rate Last Admin    sodium chloride flush 0.9 % injection 5-40 mL  5-40 mL IntraVENous 2 times per day Dalila Herrera PA-C   10 mL at 08/21/24 0804    sodium chloride flush 0.9 % injection 10 mL  10 mL IntraVENous PRN Dalila Herrera PA-C        0.9 % sodium chloride infusion   IntraVENous PRN Dalila Herrera PA-C        acetaminophen (TYLENOL) tablet 650 mg  650 mg Oral Q6H PRN Dalila Herrera PA-C        Or    acetaminophen  providing sedation and analgesia and maximize the potential amnesia.  Patient to meet pre-procedure discharge plan.    Medication Planned:  midazolam intravenously and fentanyl intravenously    Patient is an appropriate candidate for plan of sedation:   yes      Electronically signed by Melvin Corral MD on 8/21/2024 at 9:24 AM

## 2024-08-22 ENCOUNTER — TELEPHONE (OUTPATIENT)
Dept: CARDIOLOGY CLINIC | Age: 69
End: 2024-08-22

## 2024-08-22 VITALS
HEART RATE: 85 BPM | SYSTOLIC BLOOD PRESSURE: 122 MMHG | RESPIRATION RATE: 18 BRPM | TEMPERATURE: 98.4 F | WEIGHT: 196.3 LBS | OXYGEN SATURATION: 96 % | DIASTOLIC BLOOD PRESSURE: 81 MMHG | HEIGHT: 71 IN | BODY MASS INDEX: 27.48 KG/M2

## 2024-08-22 PROBLEM — I50.9 ACUTE ON CHRONIC CONGESTIVE HEART FAILURE (HCC): Status: ACTIVE | Noted: 2024-08-22

## 2024-08-22 LAB
ANION GAP SERPL CALCULATED.3IONS-SCNC: 15 MMOL/L (ref 3–16)
BUN SERPL-MCNC: 27 MG/DL (ref 7–20)
CALCIUM SERPL-MCNC: 8.8 MG/DL (ref 8.3–10.6)
CHLORIDE SERPL-SCNC: 104 MMOL/L (ref 99–110)
CO2 SERPL-SCNC: 19 MMOL/L (ref 21–32)
CREAT SERPL-MCNC: 1.5 MG/DL (ref 0.8–1.3)
DEPRECATED RDW RBC AUTO: 15.1 % (ref 12.4–15.4)
GFR SERPLBLD CREATININE-BSD FMLA CKD-EPI: 50 ML/MIN/{1.73_M2}
GLUCOSE SERPL-MCNC: 144 MG/DL (ref 70–99)
HCT VFR BLD AUTO: 39.3 % (ref 40.5–52.5)
HGB BLD-MCNC: 12.8 G/DL (ref 13.5–17.5)
MAGNESIUM SERPL-MCNC: 2.4 MG/DL (ref 1.8–2.4)
MCH RBC QN AUTO: 27 PG (ref 26–34)
MCHC RBC AUTO-ENTMCNC: 32.4 G/DL (ref 31–36)
MCV RBC AUTO: 83.4 FL (ref 80–100)
NT-PROBNP SERPL-MCNC: 8358 PG/ML (ref 0–124)
PLATELET # BLD AUTO: 228 K/UL (ref 135–450)
PMV BLD AUTO: 7.9 FL (ref 5–10.5)
POTASSIUM SERPL-SCNC: 4 MMOL/L (ref 3.5–5.1)
RBC # BLD AUTO: 4.72 M/UL (ref 4.2–5.9)
SODIUM SERPL-SCNC: 138 MMOL/L (ref 136–145)
WBC # BLD AUTO: 7 K/UL (ref 4–11)

## 2024-08-22 PROCEDURE — 83880 ASSAY OF NATRIURETIC PEPTIDE: CPT

## 2024-08-22 PROCEDURE — 6370000000 HC RX 637 (ALT 250 FOR IP): Performed by: INTERNAL MEDICINE

## 2024-08-22 PROCEDURE — 80048 BASIC METABOLIC PNL TOTAL CA: CPT

## 2024-08-22 PROCEDURE — 36415 COLL VENOUS BLD VENIPUNCTURE: CPT

## 2024-08-22 PROCEDURE — 85027 COMPLETE CBC AUTOMATED: CPT

## 2024-08-22 PROCEDURE — 6360000002 HC RX W HCPCS: Performed by: PHYSICIAN ASSISTANT

## 2024-08-22 PROCEDURE — 99233 SBSQ HOSP IP/OBS HIGH 50: CPT | Performed by: STUDENT IN AN ORGANIZED HEALTH CARE EDUCATION/TRAINING PROGRAM

## 2024-08-22 PROCEDURE — 99232 SBSQ HOSP IP/OBS MODERATE 35: CPT

## 2024-08-22 PROCEDURE — 2580000003 HC RX 258: Performed by: PHYSICIAN ASSISTANT

## 2024-08-22 PROCEDURE — 83735 ASSAY OF MAGNESIUM: CPT

## 2024-08-22 RX ORDER — TORSEMIDE 20 MG/1
20 TABLET ORAL 2 TIMES DAILY
Status: DISCONTINUED | OUTPATIENT
Start: 2024-08-22 | End: 2024-08-22 | Stop reason: HOSPADM

## 2024-08-22 RX ORDER — CARVEDILOL 3.12 MG/1
3.12 TABLET ORAL 2 TIMES DAILY WITH MEALS
Qty: 60 TABLET | Refills: 3 | Status: SHIPPED | OUTPATIENT
Start: 2024-08-22

## 2024-08-22 RX ORDER — FUROSEMIDE 40 MG
40 TABLET ORAL DAILY
Qty: 60 TABLET | Refills: 0 | Status: SHIPPED | OUTPATIENT
Start: 2024-08-22 | End: 2024-08-29 | Stop reason: SDUPTHER

## 2024-08-22 RX ORDER — ASPIRIN 81 MG/1
81 TABLET ORAL DAILY
Qty: 30 TABLET | Refills: 3 | Status: SHIPPED | OUTPATIENT
Start: 2024-08-22

## 2024-08-22 RX ORDER — ATORVASTATIN CALCIUM 80 MG/1
80 TABLET, FILM COATED ORAL NIGHTLY
Qty: 30 TABLET | Refills: 3 | Status: SHIPPED | OUTPATIENT
Start: 2024-08-22

## 2024-08-22 RX ADMIN — FUROSEMIDE 40 MG: 10 INJECTION, SOLUTION INTRAMUSCULAR; INTRAVENOUS at 09:16

## 2024-08-22 RX ADMIN — CARVEDILOL 3.12 MG: 3.12 TABLET, FILM COATED ORAL at 09:16

## 2024-08-22 RX ADMIN — ASPIRIN 81 MG: 81 TABLET, COATED ORAL at 09:16

## 2024-08-22 RX ADMIN — Medication 10 ML: at 09:46

## 2024-08-22 ASSESSMENT — PAIN SCALES - GENERAL: PAINLEVEL_OUTOF10: 0

## 2024-08-22 NOTE — CARE COORDINATION
Discharge Planning Note:  Chart reviewed for discharge needs and it appears that patient has minimal needs for discharge at this time.   Risk Score 11 %     Primary Care Physician is Alina Ponce MD    Primary insurance is [employer]    Please notify case management if any discharge needs are identified.      Case management will continue to follow progress and update discharge plan as needed.

## 2024-08-22 NOTE — DISCHARGE INSTRUCTIONS
LEFT HEART CATHETERIZATION    Care of your puncture site:  Remove bandage 24 hours after the procedure.  May shower in 24 hours but do not sit in a bathtub/pool of water for 5 days or until the wound is healed.  Inspect the site daily and gently clean using soap and water while standing in the shower.  Dry thoroughly and apply a Band-Aid that covers the entire site. Do not apply powder or lotion.    Normal Observations:  Soreness or tenderness which may last one week.  Mild oozing from the incision site.  Possible bruising that could last 2 weeks.    Activity:  You may resume driving 24 hours following the procedure.  You may resume normal activity in 5 days or after the wound heals.  Avoid lifting more than 10 pounds for 5 days or until the wound heals.  Avoid strenuous exercise or activity for 1 week.    Nutrition:  Regular - Low sodium is recommended.  Drink at least 8 to 10 glasses of decaffeinated, non-alcoholic fluid for the next 24 hours to flush the x-ray dye used for your angiogram out of your body.    Call your doctor immediately if your condition worsens, for any other concerns, for a follow-up appointment or if you experience any of the following:  Significant bleeding that does not stop after 10 minutes of applying firm pressure on the puncture site.  Increased swelling on the groin or leg.  Unusual pain, numbness, or tingling of the groin or down the leg.  Any signs of infection such as: redness, yellow drainage at the site, swelling or pain.

## 2024-08-22 NOTE — PROGRESS NOTES
Pt agressive upon entry to room. Demanding behavior (to remove IV) with expectations of discharge. Difficult to redirection or education is possible from a female staff member due to interruptions from pt AND FAMILY. Reluctant to allow staff to assess groin site. Pt is wanting to discharge today, MD notified.

## 2024-08-22 NOTE — CONSULTS
[facility]  HEART FAILURE PROGRAM      Ariel Garcia 1955    History:  Past Medical History:   Diagnosis Date    JERO (Hamm's esophagus)     Ganglion cyst     Heart murmur     Hypertension     Scoliosis        ECHO:  8/20/24      Left Ventricle: Severely reduced left ventricular systolic function. EF by visual approximation is 25%. EF by 2D Simpsons Biplane is 26%. Left ventricle size is normal. Mildly increased wall thickness. Severe global hypokinesis present. Grade II diastolic dysfunction with increased LAP. Average E/e' ratio is 11.0.    Right Ventricle: Reduced systolic function. TAPSE is 1.3 cm. RV Peak S' is 8 cm/s.    Mitral Valve: Mild annular calcification. Mildly thickened leaflets. Mild regurgitation.    Tricuspid Valve: The estimated RVSP is 30 mmHg.    Left Atrium: Left atrium is severely dilated.    Right Atrium: Right atrium is mildly dilated.    Aorta: Normal sized ascending aorta. Dilated aortic root. Ao root diameter is 3.6 cm.    Image quality is adequate. Contrast used: Definity. Technically difficult study with poor endocardial visualization.    ACE/ARB/ARNi: Patient not on- consider if not contraindicated      BB: coreg 3.125 mg bid  Aldosterone Antagonist: Patient not on- consider if not contraindicated     SGLT2: jardiance 10 mg daily    History of sleep apnea: No    [place] Screen ordered: No    DM History: No    Last Hospital Admission: 5/258/11 with chest pain  Code Status: full   Discharge plans: from home    Discussed importance of lifestyle changes: encouraged daily weights, sodium/fluid limit      Ariel Garcia was admitted to the hospital with shortness of breath. Patient found to have new HFrEF. Patient seen as he was in wheel chair ready to discharge as patient was in a hurry to get home. Educated patient/family on CHF signs and symptoms, causes, treatments, limited fluid intake, daily weights, HF medications, low sodium diet, activity and follow-up.  Patient to

## 2024-08-22 NOTE — PROGRESS NOTES
Patient and his spouse left the unit despite education to \"find Dr. Fu and demand a discharge order.\" He did return several minutes later, this writer is unsure if he successfully located the cardiology office or not, patient is hostile and only answers some questions, to my understanding he is refusing all interventions and wants to be discharged home. Attending Grace and MARTINEZ Cruz made aware.

## 2024-08-22 NOTE — PLAN OF CARE
Problem: Discharge Planning  Goal: Discharge to home or other facility with appropriate resources  8/22/2024 0011 by Odin Arce RN  Outcome: Progressing  8/21/2024 1406 by Cristi Pastrana RN  Outcome: Progressing     Problem: Safety - Adult  Goal: Free from fall injury  8/22/2024 0011 by Odin Arce RN  Outcome: Progressing  8/21/2024 1406 by Cristi Pastrana RN  Outcome: Progressing     Problem: Pain  Goal: Verbalizes/displays adequate comfort level or baseline comfort level  8/22/2024 0011 by Odin Arce RN  Outcome: Progressing  8/21/2024 1406 by Cristi Pastrana RN  Outcome: Progressing     Problem: ABCDS Injury Assessment  Goal: Absence of physical injury  8/22/2024 0011 by Odin Arce RN  Outcome: Progressing  8/21/2024 1406 by Cristi Pastrana RN  Outcome: Progressing     Problem: Respiratory - Adult  Goal: Achieves optimal ventilation and oxygenation  Outcome: Progressing     Problem: Cardiovascular - Adult  Goal: Maintains optimal cardiac output and hemodynamic stability  Outcome: Progressing  Goal: Absence of cardiac dysrhythmias or at baseline  Outcome: Progressing     Problem: Metabolic/Fluid and Electrolytes - Adult  Goal: Electrolytes maintained within normal limits  Outcome: Progressing  Goal: Hemodynamic stability and optimal renal function maintained  Outcome: Progressing  Goal: Glucose maintained within prescribed range  Outcome: Progressing

## 2024-08-22 NOTE — PROGRESS NOTES
CVTS Thoracic Progress Note:          CC:  SOB, BLE edema    History of Present Illness:      Ariel Garcia is a 68 y.o. male with significant diagnosed past medical history of CAD with no follow up, Diabetes- HgbA1c of 8.2 in 2021 with no follow up, HTN-  not taking medications, HLD, who presents to the emergency department on 8/19 with complaints of shortness of breath that has been worsening since April but has worsened more over the last 2 weeks, swelling in both legs/feet with the same timeline. The workup in the emergency department revealed BNP of 16,528, Troponin of 34, 36 and 49, was found to be in heart failure, admitted to the hospital for management and workup.  Left heart angiogram found severe multivessel coronary artery disease. CVTS was consulted by Dr Corral for surgical evaluation for CABG.    Subj: Aggressive, demanding behavior this AM staff. Wants to be discharged.     Obj:    Blood pressure 122/81, pulse 85, temperature 97.5 °F (36.4 °C), temperature source Temporal, resp. rate 20, height 1.803 m (5' 11\"), weight 89 kg (196 lb 4.8 oz), SpO2 96%.   S1 S2 normal. SR on monitor   UOP none documented ; Cr 1.5      Diagnostics:   CBC with Differential:    Lab Results   Component Value Date/Time    WBC 7.0 08/22/2024 05:17 AM    RBC 4.72 08/22/2024 05:17 AM    HGB 12.8 08/22/2024 05:17 AM    HCT 39.3 08/22/2024 05:17 AM     08/22/2024 05:17 AM    MCV 83.4 08/22/2024 05:17 AM    MCH 27.0 08/22/2024 05:17 AM    MCHC 32.4 08/22/2024 05:17 AM    RDW 15.1 08/22/2024 05:17 AM    LYMPHOPCT 18.8 08/21/2024 05:05 AM    MONOPCT 11.6 08/21/2024 05:05 AM    EOSPCT 4.4 08/21/2024 05:05 AM    BASOPCT 0.5 08/21/2024 05:05 AM    MONOSABS 0.9 08/21/2024 05:05 AM    LYMPHSABS 1.4 08/21/2024 05:05 AM    EOSABS 0.3 08/21/2024 05:05 AM    BASOSABS 0.0 08/21/2024 05:05 AM    DIFFTYPE Auto 05/27/2011 09:59 PM     CMP:    Lab Results   Component Value Date/Time     08/22/2024 05:17 AM    K 4.0 08/22/2024 05:17  AM     08/22/2024 05:17 AM    CO2 19 08/22/2024 05:17 AM    BUN 27 08/22/2024 05:17 AM    CREATININE 1.5 08/22/2024 05:17 AM    GFRAA >60 06/01/2011 04:56 AM    LABGLOM 50 08/22/2024 05:17 AM    GLUCOSE 144 08/22/2024 05:17 AM    CALCIUM 8.8 08/22/2024 05:17 AM     Recent Labs     08/20/24  0748 08/21/24  0505 08/22/24  0517   WBC 6.5 7.4 7.0   HGB 13.0* 12.6* 12.8*   HCT 40.9 38.4* 39.3*    216 228                                                                  Recent Labs     08/20/24  0748 08/21/24  0505 08/22/24  0517   * 136 138   K 4.1 3.9 4.0   CL 99 103 104   CO2 20* 21 19*   BUN 21* 28* 27*   CREATININE 1.3 1.5* 1.5*   GLUCOSE 104* 131* 144*            Recent Labs     08/21/24  0505 08/22/24  0517   MG 2.30 2.40      No results for input(s): \"TROPONINI\" in the last 72 hours.  No results for input(s): \"INR\" in the last 72 hours.    MEDICAL DECISION MAKING/TESTING  Studies personally reviewed.     Cath:   Left Anterior Descending   Collaterals   Dist LAD filled by collaterals from 1st Sept.       Mid LAD lesion, 80% stenosed. The lesion is segmental.   Dist LAD lesion, 100% stenosed. Diagnostic coronary angiography shows positive for .      First Diagonal Branch   1st Diag lesion, 95% stenosed. The lesion is segmental.      Second Diagonal Branch   2nd Diag lesion, 95% stenosed.      Left Circumflex   Mid Cx to Dist Cx lesion, 90% stenosed. The lesion is segmental.      Right Coronary Artery   Mid RCA lesion, 80% stenosed.      Right Posterior Descending Artery   RPDA lesion, 99% stenosed.         Echo:   Left Ventricle Severely reduced left ventricular systolic function. EF by visual approximation is 25%. EF by 2D Simpsons Biplane is 26%. Left ventricle size is normal. Mildly increased wall thickness. Severe global hypokinesis present. Grade II diastolic dysfunction with increased LAP. Average E/e' ratio is 11.0.   Left Atrium Left atrium is severely dilated.   Right Ventricle Right  No,  (68 y.o.) 0   D DM No 0   S2 Prior Stroke/TIA No 0   V Vascular Disease No 0   A Age 65-74 Yes,  (68 y.o.) 1   Sc Sex male 0    [id number]  Score  3   Score last updated 8/22/24 9:55 AM EDT    Click here for a link to the UpToDate guideline \"Atrial Fibrillation: Anticoagulation therapy to prevent embolization    Disclaimer: Risk Score calculation is dependent on accuracy of patient problem list and past encounter diagnosis.     STS          Procedure Type: Isolated CABG   PERIOPERATIVE OUTCOME ESTIMATE %   Operative Mortality 5.26%   Morbidity & Mortality 23.6%   Stroke 3.55%   Renal Failure 4.78%   Reoperation 5.27%   Prolonged Ventilation 15.1%   Deep Sternal Wound Infection 0.206%   Long Hospital Stay (>14 days) 15.9%   Short Hospital Stay (<6 days)* 18.5%      *higher values reflect a better outcome   Copy      Clinical Summary         Planned Surgery: Isolated CABG, Emergent, First cardiovascular surgery   Demographics: 68 year old, male, 89.9kg, 180cm, BMI: 27.8 kg/m²   Lab Values: Creatinine: 1.5 mg/dL, Hematocrit: 38.4%, WBC Count: 7.4 10³/?L, Platelet Count: 658082 cells/?L   PreOp Medications: ACE Inhibitors/ARBs ? 48 hrs   Substance Abuse: Former smoker   Risk Factors / Comorbidities: Diabetes Mellitus , Hypertension, Family Hx of CAD   Pulmonary RF: Mild CLD   Cardiac Status: Acute and chronic heart failure, NYHA Class IV, Ejection Fraction = 25%   Coronary Artery Disease: 3 vessels diseased, Proximal LAD Stenosis ? 70%, MI: > 21 Days   Valve Disease: Trivial/Trace AR, Mild MR, Trivial/Trace TR        Severe Multivessel CAD    - [facility] 8/21 w/ severe multi vessel CAD and severely reduced EF 25%.  No plans for surgical revascularization from CVTS standpoint. Discussed via heat team approach this morning. Agree no suitable targets for surgical revascularization.  High surgical risk and concerns for rehab potential, in addition to concerns for non compliance. Additionally, pt does not wish to pursue

## 2024-08-22 NOTE — PROGRESS NOTES
[facility]    Admit Date: 2024    PCP: Alina Ponce MD                  : 1955  MRN: [id number]    Subjective:   Interval History:   Patient seen and examined. Clinical notes reviewed.   Telemetry reviewed. No new complaint today.   No major events overnight.   Denies having chest pain, shortness of breath, dyspnea on exertion, Orthopnea, PND at the time of this visit.  Ariel Garcia is a 68 y.o. male with significant diagnosed past medical history of CAD with no follow up, Diabetes- HgbA1c of 8.2 in  with no follow up, HTN-  not taking medications, HLD, who presents to the emergency department on  with complaints of shortness of breath that has been worsening since April but has worsened more over the last 2 weeks, swelling in both legs/feet with the same timeline. The workup in the emergency department revealed BNP of 16,528, Troponin of 34, 36 and 49, was found to be in heart failure, admitted to the hospital for management and workup.  Left heart angiogram found severe multivessel coronary artery disease.      Review of System:  Pertinent positive and negatives are in the HPI and interval above, the rest are negative.     Data:   Scheduled Meds:   sodium chloride flush  5-40 mL IntraVENous 2 times per day    sodium chloride flush  5-40 mL IntraVENous 2 times per day    aspirin  81 mg Oral Daily    atorvastatin  80 mg Oral Nightly    sodium chloride flush  5-40 mL IntraVENous 2 times per day    enoxaparin  40 mg SubCUTAneous Daily    furosemide  40 mg IntraVENous BID    [Held by provider] lisinopril  10 mg Oral Daily    carvedilol  3.125 mg Oral BID WC    lidocaine  1 patch TransDERmal Daily     PRN Meds:sodium chloride flush, sodium chloride, sodium chloride flush, sodium chloride, acetaminophen, oxyCODONE-acetaminophen **OR** oxyCODONE-acetaminophen, sodium chloride flush, sodium chloride, [DISCONTINUED] acetaminophen **OR** acetaminophen, senna, ondansetron  I/O  input(s): \"ALB\"    Troponin: No results for input(s): \"TROPONINI\" in the last 72 hours.    BNP: No results for input(s): \"BNP\" in the last 72 hours.    Lipids:   Recent Labs     08/21/24  0505   CHOL 106   HDL 21*       INR: No results for input(s): \"INR\" in the last 72 hours.  -----------------------------------------------------------------  RAD:   CT CHEST PULMONARY EMBOLISM W CONTRAST   Final Result   1. No evidence of pulmonary embolism.   2. Moderate right pleural effusion. Small left pleural effusion.   3. Mild pulmonary edema.         XR CHEST (2 VW)   Final Result   Left basilar atelectasis.           Echo:   Left Ventricle Severely reduced left ventricular systolic function. EF by visual approximation is 25%. EF by 2D Simpsons Biplane is 26%. Left ventricle size is normal. Mildly increased wall thickness. Severe global hypokinesis present. Grade II diastolic dysfunction with increased LAP. Average E/e' ratio is 11.0.   Left Atrium Left atrium is severely dilated.   Right Ventricle Right ventricle size is normal. Reduced systolic function. TAPSE is 1.3 cm. RV Peak S' is 8 cm/s.   Right Atrium Right atrium is mildly dilated.   Aortic Valve Valve structure is normal. Trace regurgitation. No stenosis.   Mitral Valve Mild annular calcification. Mildly thickened leaflets. Mild regurgitation. No stenosis noted.   Tricuspid Valve Valve structure is normal. Trace regurgitation. No stenosis noted. The estimated RVSP is 30 mmHg.   Pulmonic Valve The pulmonic valve visualization is suboptimal but appears to be functioning normally. Trace regurgitation. No stenosis noted.   Aorta Normal sized ascending aorta. Dilated aortic root. Ao root diameter is 3.6 cm.   IVC/Hepatic Veins IVC diameter is greater than 21 mm and decreases less than 50% during inspiration; therefore the estimated right atrial pressure is elevated (~15 mmHg). IVC size is normal.   Pericardium No pericardial effusion.   Extracardiac No pleural effusion  present.     Cath: Cath:   Left Anterior Descending   Collaterals   Dist LAD filled by collaterals from 1st Sept.       Mid LAD lesion, 80% stenosed. The lesion is segmental.   Dist LAD lesion, 100% stenosed. Diagnostic coronary angiography shows positive for .      First Diagonal Branch   1st Diag lesion, 95% stenosed. The lesion is segmental.      Second Diagonal Branch   2nd Diag lesion, 95% stenosed.      Left Circumflex   Mid Cx to Dist Cx lesion, 90% stenosed. The lesion is segmental.      Right Coronary Artery   Mid RCA lesion, 80% stenosed.      Right Posterior Descending Artery   RPDA lesion, 99% stenosed.           Assessment/Plan:   Acute Coronary Syndrome / NSTEMI  - Biomarkers moderately positive  - Continue ASA and start Plavix  - Continue Statin  - Heparin gtt  - Extensive discussion with patient has multi vessel disease which honestly given his reduced EF and CCS4 angina merits revascularization.  He is declining CABG outright and also does not want PCI at this point because his father lived an additional 20 years post MI without revascularization.     I discussed with him and his entire family that was present regarding his overall risk of sudden cardiac death, arrhythmia, HF prognosis and repeat MI.  I encouraged him that should he decide for PCI in the future we can arrange this with Dr. Corral who performed his diagnostic angiography.    Discussed the case with Dr. Fu as well    Given his profound non-revascularized cardiomyopathy in the setting acute MI he will need a lifevest as well.    Plan to start Plavix  Continue Lisinopril/BB  MRA as outpatient given compliance concern and potassium monitoring  Start SGLT2 as an outpatient as well       Total visit time > 55 minutes; > 50% spend counseling / coordinating care. I reviewed interval history, physical exam, review of data including labs, imaging, development and implementation of treatment plan and coordination of complex care. Counseled

## 2024-08-22 NOTE — PROGRESS NOTES
Data- discharge order received, pt eagerly verbalized agreement to discharge-reports \"I have to leave, my wife's brother is on Hospice and she needs me to be with her while she visits with him\"-pt has been demanding a discharge since early AM. disposition to previous residence, no needs for HHC/DME. Pt has orders for Life Vest but at this point he is refusing to stay and wait for [employer] rep. Pt states \"my wife spoke to soon for me, I don't think I need it really\", reports he does nto want the vest. Pt was educated on need and importance of vest-also given contact information for [employer] should he change his mind.    Action- discharge instructions prepared and given to Pt with spouse and 2 daughters at his side, pt verbalized understanding. Medication information packet given r/t NEW and/or CHANGED prescriptions emphasizing name/purpose/side effects, pt verbalized understanding. Discharge instruction summary: Diet- Low sodium-hand out and verbal eduction provided, Activity- as tolerated with 5 lb lifting restriction due to 2 post cath puncture sites, Primary Care Physician as follows: Alina Ponce MD None listed but pt voiced that he no longer uses this as his PCP and sharply replied that he has no PCP-refused list of PCP's. Pt educated on need for f/u appointments to help manage on going new medical care. Pt reports he will be following up with Dr Fu-Follow up appointment with HF team also schedule but pt seems reluctant to follow up with this appointment. prescription medications filled via meds to beds and already at pt's side-each medication explained to pt and family. Inpatient surgical procedure precautions reviewed: post cath site management and restrictions reviewed as well as hand out.  CHF Education reviewed. Pt/ Family has had a total of 60 minutes CHF education this admission encounter.     1. WEIGHT: Admit Weight - Scale: 86.2 kg (190 lb) (08/19/24 2237)        Today  Weight - Scale: 89 kg (196

## 2024-08-22 NOTE — PROGRESS NOTES
Pt is displaying symptoms of Denial. He has been anxious all night. Now he is increasingly anxious, , agitated and restless. He is requesting to be discharged. Pt refused tele, requested one of his IVs out and now putting on his clothing. He Stated \"Im going home. Im not getting my chest cut open. Ill be fine like my father was\". I educated the patient and family and gave them printed information regarding the procedure, benefits and risk. I encouraged the patient to wait for the doctor and he agreed.

## 2024-08-22 NOTE — TELEPHONE ENCOUNTER
Daughter, Lori Garcia, dropped off FMLA paperwork to be filled out.  She will be helping to take care of her dad and will be taking him to his doctor appointments.  This will be for intermittent fmla.  Lori can be reached at [phone].  Please call when ready so she can pick these up. Form has been placed in LES mailbox in MA room.

## 2024-08-22 NOTE — PROGRESS NOTES
CLINICAL PHARMACY NOTE: MEDS TO BEDS    Total # of Prescriptions Filled: 5   The following medications were delivered to the patient:  FUROSEMIDE 40MG  JARDIANCE 10MG  ATORVASTATIN CALCIUM 80MG  ASPIRIN 81MG  CARVEDILOL 3.125MG    Additional Documentation:  Delivered to patients room = signed  Ok to be delivered per MARTINEZ Hennessy - Pharmacy Tech.

## 2024-08-23 ENCOUNTER — TELEPHONE (OUTPATIENT)
Dept: OTHER | Age: 69
End: 2024-08-23

## 2024-08-23 NOTE — PROGRESS NOTES
showed multivessel disease, patient refuse CABG and PCI during the last hospital stay  At today's  patient denies any anginal symptoms  Patient currently taken ASA, Coreg, and Lipitor  Plan: schedule PCI with Dr. Corral    CHF/ cardiomyopathy  2024 ECHO showed EF 25%   ECHO: EF 65-70%  Patient appears stable  on physical exam  Patient currently taken Coreg, Jardiance, and Lasix, no ACE/ ARB due to CKD/ low B/P  Plan: continue current medications    HTN  Today's B/P- 128/60- stable  Plan: continue current medications     HLD  Patient currently taken Lipitor 80 mg nightly  2024: TC: 106, TRI, HDL: 21, LDL: 65  Plan: continue current medication     Tobacco Abuse  Smoking cessation was discussed at [employer]     Patient  is stable since hospital discharge.    Plan: Continue current medications           Discuss with the patient and his wife they agree on PCI with Dr. Corral           Order BMP- elevated creat 1.5-during the last hospital stay                 I have addressed the patient's cardiac risk factors and adjusted pharmacologic treatment as needed. In addition, I have reinforced the need for patient directed risk factor modification.    Further evaluation will be based upon the patient's clinical course and testing results.    All questions and concerns were addressed to the patient/family. Alternatives to  treatment were discussed.     The patient  currently  is smoking. The risks related to smoking were reviewed with the patient. Recommend maintaining a smoke-free lifestyle. Products available for smoking cessation were discussed.    Daily weight, low sodium diet were discussed. Patient instructed to call the office with a weight gain: > 3 lbs over night or 5 lbs in one week; swelling, SOB/orthopnea/PND    Thank you for allowing to us to participate in the care of Ariel Garcia.      [facility] [place]   Criss Ritchie CNP

## 2024-08-23 NOTE — DISCHARGE SUMMARY
Hospitalist Discharge Summary     Ariel Garcia  : 1955  MRN: [id number]    Admit date: 2024  Discharge date: 2024    Admitting Physician: Mitch Vizcarra MD    Discharge Diagnoses:   Patient Active Problem List   Diagnosis    Chest pain    Essential hypertension    Hamm's esophagus    Anxiety    CAD (coronary artery disease)    Shortness of breath    Acute on chronic congestive heart failure (HCC)       Admission Condition: poor    Discharged Condition: fair    Discharge Exam:  VITALS:  /81   Pulse 85   Temp 98.4 °F (36.9 °C) (Temporal)   Resp 18   Ht 1.803 m (5' 11\")   Wt 89 kg (196 lb 4.8 oz)   SpO2 96%   BMI 27.38 kg/m²   CONSTITUTIONAL:  awake, alert, cooperative, no apparent distress, and appears stated age  LUNGS:  clear to auscultation bilaterally, No increased work of breathing, good air exchange, no crackles or wheezing  CARDIOVASCULAR:  Regular rate and rhythm, normal S1 and S2, no S3 or S4, and no significant murmurs, rubs or gallops noted.   NEUROLOGIC:  Awake, alert, oriented to name, place and time.  Cranial nerves II-XII are grossly intact.        Hospital Course:   68 M  w HTN admitted with acute chf, found to have  severely reduced EF  and golbal hypokinesis, LHC showed multivessel diseased. Diuresed well with iv lasix and started on GDMT  with asa,b blocker, jardiance  but no  ace/arb or anri d/t low/borderline BP. Had di/w both cardio and CT sx about staged pci  vs   cabg. Pt initially  agreed on pci but later refused any intervention despite detailed discussion about  high possibility of morbidity and mortality. Life vest was offered  but refused that as well despite clear discussion of risk of fatal arrythmias  with untreated ischemia. Pt will f/u with  his cardiologist Dr Fu for further discussion    CKD IIIa  Renal fn stable with gfr ~2  Chief Complaint   Patient presents with    Shortness of Breath     Pt arrived [facility] from home. Pt called ems due  for review.  MIP images are provided for review. Automated exposure control, iterative reconstruction, and/or weight based adjustment of the mA/kV was utilized to reduce the radiation dose to as low as reasonably achievable. COMPARISON: None. HISTORY: ORDERING SYSTEM PROVIDED HISTORY: Elevated ddimer, dyspnea TECHNOLOGIST PROVIDED HISTORY: Reason for exam:->Elevated ddimer, dyspnea Additional Contrast?->1 FINDINGS: Pulmonary Arteries: Pulmonary arteries are adequately opacified for evaluation.  No evidence of intraluminal filling defect to suggest pulmonary embolism.  Main pulmonary artery is normal in caliber. Mediastinum: No evidence of mediastinal lymphadenopathy.  The heart and pericardium demonstrate no acute abnormality.  There is no acute abnormality of the thoracic aorta. Lungs/pleura: Moderate right pleural effusion.  Small left pleural effusion. Interstitial thickening in both lungs suggesting pulmonary edema. Upper Abdomen: Limited images of the upper abdomen are unremarkable. Soft Tissues/Bones: No acute bone or soft tissue abnormality.     1. No evidence of pulmonary embolism. 2. Moderate right pleural effusion. Small left pleural effusion. 3. Mild pulmonary edema.     XR CHEST (2 VW)    Result Date: 8/19/2024  EXAMINATION: TWO XRAY VIEWS OF THE CHEST 8/19/2024 10:58 pm COMPARISON: None. HISTORY: ORDERING SYSTEM PROVIDED HISTORY: CHF TECHNOLOGIST PROVIDED HISTORY: Reason for exam:->CHF Reason for Exam: Shortness of Breath FINDINGS: The cardiomediastinal silhouette is normal size.  There is left basilar atelectasis.  No consolidation or venous congestion.No pleural effusion or pneumothorax identified.     Left basilar atelectasis.         Other Significant Diagnostic Studies: As described above     Treatments: As described above    Disposition: home    Discharge Medications:       Medication List        START taking these medications      aspirin 81 MG EC tablet  Take 1 tablet by mouth daily  Replaces:

## 2024-08-23 NOTE — TELEPHONE ENCOUNTER
[facility]  HEART FAILURE PROGRAM  TELEPHONE ENCOUNTER FORM    Ariel Wilsonvasu 1955    Attempted to call patient for HF follow-up x 3 attempts. No answer at this time.      Next MD/ Clinic appointment: 8/26 with Criss VALENTIN RN 8/23/2024 11:37 AM

## 2024-08-26 ENCOUNTER — TELEPHONE (OUTPATIENT)
Dept: CARDIOLOGY CLINIC | Age: 69
End: 2024-08-26

## 2024-08-26 ENCOUNTER — OFFICE VISIT (OUTPATIENT)
Dept: CARDIOLOGY CLINIC | Age: 69
End: 2024-08-26

## 2024-08-26 ENCOUNTER — HOSPITAL ENCOUNTER (OUTPATIENT)
Age: 69
Discharge: HOME | End: 2024-08-26
Payer: MEDICARE

## 2024-08-26 VITALS
SYSTOLIC BLOOD PRESSURE: 128 MMHG | DIASTOLIC BLOOD PRESSURE: 60 MMHG | HEART RATE: 78 BPM | HEIGHT: 71 IN | WEIGHT: 196 LBS | OXYGEN SATURATION: 91 % | BODY MASS INDEX: 27.44 KG/M2

## 2024-08-26 DIAGNOSIS — I25.83 CORONARY ARTERY DISEASE DUE TO LIPID RICH PLAQUE: Chronic | ICD-10-CM

## 2024-08-26 DIAGNOSIS — I50.23 ACUTE ON CHRONIC SYSTOLIC CONGESTIVE HEART FAILURE (HCC): ICD-10-CM

## 2024-08-26 DIAGNOSIS — I25.10 CORONARY ARTERY DISEASE DUE TO LIPID RICH PLAQUE: Chronic | ICD-10-CM

## 2024-08-26 DIAGNOSIS — I50.23 ACUTE ON CHRONIC SYSTOLIC CONGESTIVE HEART FAILURE (HCC): Primary | ICD-10-CM

## 2024-08-26 DIAGNOSIS — E78.2 MIXED HYPERLIPIDEMIA: ICD-10-CM

## 2024-08-26 DIAGNOSIS — I10 ESSENTIAL HYPERTENSION: Chronic | ICD-10-CM

## 2024-08-26 LAB
ALBUMIN SERPL-MCNC: 4 G/DL (ref 3.4–5)
ALBUMIN/GLOB SERPL: 1.4 {RATIO} (ref 1.1–2.2)
ALP SERPL-CCNC: 69 U/L (ref 40–129)
ALT SERPL-CCNC: 39 U/L (ref 10–40)
ANION GAP SERPL CALCULATED.3IONS-SCNC: 13 MMOL/L (ref 3–16)
AST SERPL-CCNC: 27 U/L (ref 15–37)
BILIRUB SERPL-MCNC: 0.9 MG/DL (ref 0–1)
BUN SERPL-MCNC: 23 MG/DL (ref 7–20)
CALCIUM SERPL-MCNC: 8.9 MG/DL (ref 8.3–10.6)
CHLORIDE SERPL-SCNC: 101 MMOL/L (ref 99–110)
CO2 SERPL-SCNC: 24 MMOL/L (ref 21–32)
CREAT SERPL-MCNC: 1.3 MG/DL (ref 0.8–1.3)
GFR SERPLBLD CREATININE-BSD FMLA CKD-EPI: 60 ML/MIN/{1.73_M2}
GLUCOSE SERPL-MCNC: 181 MG/DL (ref 70–99)
POTASSIUM SERPL-SCNC: 4.1 MMOL/L (ref 3.5–5.1)
PROT SERPL-MCNC: 6.8 G/DL (ref 6.4–8.2)
SODIUM SERPL-SCNC: 138 MMOL/L (ref 136–145)

## 2024-08-26 PROCEDURE — 36415 COLL VENOUS BLD VENIPUNCTURE: CPT

## 2024-08-26 PROCEDURE — 80053 COMPREHEN METABOLIC PANEL: CPT

## 2024-08-26 NOTE — TELEPHONE ENCOUNTER
----- Message from RAMONA Buchanan CNP sent at 8/26/2024 11:55 AM EDT -----  PCI of CX, LAD, RCA, Harjinder with Dr. Corral

## 2024-08-27 ENCOUNTER — TELEPHONE (OUTPATIENT)
Dept: CARDIOLOGY CLINIC | Age: 69
End: 2024-08-27

## 2024-08-27 PROBLEM — E78.2 MIXED HYPERLIPIDEMIA: Status: ACTIVE | Noted: 2024-08-27

## 2024-08-27 NOTE — TELEPHONE ENCOUNTER
Spoke with patients wife explained patients assistant paperwork to her for Jardiance she verbalized understanding. Paperwork placed in mail today. Patients wife will call our office back if she has any questions once she receives information.

## 2024-08-27 NOTE — PROGRESS NOTES
Physician Progress Note      PATIENT:               TRICIA GUTIERREZ  CSN #:                  [id number]  :                       1955  ADMIT DATE:       2024 10:27 PM  DISCH DATE:        2024 12:45 PM  RESPONDING  PROVIDER #:        Gamaliel Edwards MD          QUERY TEXT:    Patient admitted with CHF.   Per  Cardiology consult documentation   reflects Acute Coronary Syndrome / NSTEMI.  No further documentation of NSTEMI   following  by Cardiology.  Per  Cardiology- Biomarkers moderately positive  - Continue ASA and start Plavix  - Continue Statin  If possible, please document in the progress notes and discharge summary if   NSTEMI was: confirmed or ruled out.    The medical record reflects the following:  Risk Factors: 68 y.o. male with hx of CAD, Heart murmur, HTN, Scoliosis  Clinical Indicators: per  Cardiology: Biomarkers moderately positive.   Troponin of 34, 36 and 49  Treatment: Cardiology consult, LHC, ASA, Statin  Options provided:  -- NSTEMI ruled out after study  -- NSTEMI confirmed after study, Please document clinical evidence to support   NSTEMI  -- Other - I will add my own diagnosis  -- Disagree - Not applicable / Not valid  -- Disagree - Clinically unable to determine / Unknown  -- Refer to Clinical Documentation Reviewer    PROVIDER RESPONSE TEXT:    NSTEMI ruled out after study.    Query created by: Dakota Pham on 2024 12:15 PM      Electronically signed by:  Gamalile Edwards MD 2024 10:54 PM

## 2024-08-29 ENCOUNTER — TELEPHONE (OUTPATIENT)
Dept: CARDIOLOGY CLINIC | Age: 69
End: 2024-08-29

## 2024-08-29 RX ORDER — FUROSEMIDE 40 MG
40 TABLET ORAL 2 TIMES DAILY
Qty: 180 TABLET | Refills: 3 | Status: SHIPPED | OUTPATIENT
Start: 2024-08-29

## 2024-08-29 NOTE — PROGRESS NOTES
Physician Progress Note      PATIENT:               TRICIA GUTIERREZ  CSN #:                  [id number]  :                       1955  ADMIT DATE:       2024 10:27 PM  DISCH DATE:        2024 12:45 PM  RESPONDING  PROVIDER #:        Gamaliel Edwards MD          QUERY TEXT:    Pt admitted with acute systolic CHF.  Pt noted to have Hypertension   (presenting BP:  151/114) and cardiomyopathy.  Per  Cardiology PN:   Profound non-revascularized Cardiomyopathy.  If possible, please document in   progress notes and discharge summary the relationship, if any, between CHF and   hypertension or Ischemic Cardiomyopathy.    The medical record reflects the following:  Risk Factors: 68 y.o. male with hx of HTN, HLD, CAD, Profound non-   revascularized Cardiomyopathy per  Cardiology PN, every day smoker  Clinical Indicators: Presented with /114, having SOB and bilateral lower   leg swelling, BNP: 16,528  Treatment: Cardiology consult, LHC  Options provided:  -- Acute systolic CHF likely secondary to hypertension  -- Acute systolic CHF likely related to ischemic cardiomyopathy  -- Other - I will add my own diagnosis  -- Disagree - Not applicable / Not valid  -- Disagree - Clinically unable to determine / Unknown  -- Refer to Clinical Documentation Reviewer    PROVIDER RESPONSE TEXT:    This patient has acute systolic CHF likely related to ischemic cardiomyopathy.    Query created by: Dakota Pham on 2024 9:14 AM      Electronically signed by:  Gamaliel Edwards MD 2024 9:50 AM

## 2024-08-29 NOTE — TELEPHONE ENCOUNTER
PT's wife Charlotte notified and verbalized understanding.    Pt is also inquiring about a PAT form. How do obtain this for new PCP?

## 2024-08-29 NOTE — TELEPHONE ENCOUNTER
Pt's wife is reporting that the pt's feet, ankles, calf are swollen.  Pt reports being sob, denies chest discomfort, denies dizziness, states \"I just have painful feet\".    Wife is asking if pt can increase his furosemide to alleviate the swelling.    Please advise.

## 2024-08-29 NOTE — TELEPHONE ENCOUNTER
Pt's wife states pt's new pcp would like a PAT form from us.    Please let wife know when it is ready and she will pick it up and deliver it to the PCP.

## 2024-08-30 NOTE — TELEPHONE ENCOUNTER
Michael Fu MD   to [facility] Staff  Remy Snyder RN       8/30/24  7:52 AM  Note      I spoke with Dr Boothe. No special forms needed. He will see him

## 2024-09-05 NOTE — PRE SEDATION
Brief Pre-Op Note/Sedation Assessment      Ariel Garcia  1955  [id number]  8:39 AM    Planned Procedure: Cardiac Catheterization Procedure  Post Procedure Plan: Return to same level of care  Consent: I have discussed with the patient and/or the patient representative the indication, alternatives, and the possible risks and/or complications of the planned procedure and the anesthesia methods. The patient and/or patient representative appear to understand and agree to proceed.        Chief Complaint:   Chest Pain/Pressure  Dyspnea on Exertion  Dyspnea  Fatigue      Indications for Cath Procedure:  Presentation:  Stable Known CAD, Cardiomyopathy, and LV Dysfunction  2.  Anginal Classification within 2 weeks:  No symptoms  3.  Angina Symptoms Assessment:  Asymptomatic  4.  Heart Failure Class within last 2 weeks:  Yes:  Heart Failure Type: Systolic Severity:  Class IV - Symptoms of HF at rest  5.  Cardiovascular Instability:  No    Prior Ischemic Workup/Eval:  Pre-Procedural Medications: Yes: ACE/ARB/ARNI, Aspirin, Beta Blockers, and STATIN  2.   Stress Test Completed?  No    Does Patient need surgery?  Cath Valve Surgery:  No    Pre-Procedure Medical History:  Vital Signs:  There were no vitals taken for this visit.    Allergies:    Allergies   Allergen Reactions    Penicillins      Medications:    No current facility-administered medications for this encounter.       Past Medical History:    Past Medical History:   Diagnosis Date    JERO (Hamm's esophagus)     Ganglion cyst     Heart murmur     Hypertension     Scoliosis        Surgical History:    Past Surgical History:   Procedure Laterality Date    CARDIAC PROCEDURE N/A 8/21/2024    Left heart cath / coronary angiography performed by Melvin Corral MD at [facility] CARDIAC CATH LAB    UPPER GASTROINTESTINAL ENDOSCOPY  6-1-2011    with biopsy             Pre-Sedation:  Pre-Sedation Documentation and Exam:  I have assessed the patient and reviewed the H&P on the

## 2024-09-06 ENCOUNTER — HOSPITAL ENCOUNTER (OUTPATIENT)
Age: 69
Setting detail: OUTPATIENT SURGERY
Discharge: HOME | End: 2024-09-06
Attending: INTERNAL MEDICINE | Admitting: INTERNAL MEDICINE
Payer: MEDICARE

## 2024-09-06 VITALS
OXYGEN SATURATION: 99 % | BODY MASS INDEX: 27.44 KG/M2 | SYSTOLIC BLOOD PRESSURE: 116 MMHG | DIASTOLIC BLOOD PRESSURE: 86 MMHG | WEIGHT: 196 LBS | RESPIRATION RATE: 18 BRPM | HEART RATE: 93 BPM | HEIGHT: 71 IN | TEMPERATURE: 97.7 F

## 2024-09-06 DIAGNOSIS — I25.10 CORONARY ARTERY DISEASE DUE TO LIPID RICH PLAQUE: Primary | ICD-10-CM

## 2024-09-06 DIAGNOSIS — I25.83 CORONARY ARTERY DISEASE DUE TO LIPID RICH PLAQUE: Primary | ICD-10-CM

## 2024-09-06 DIAGNOSIS — I25.10 CAD (CORONARY ARTERY DISEASE): ICD-10-CM

## 2024-09-06 LAB
ANION GAP SERPL CALCULATED.3IONS-SCNC: 14 MMOL/L (ref 3–16)
BUN SERPL-MCNC: 19 MG/DL (ref 7–20)
CALCIUM SERPL-MCNC: 9.4 MG/DL (ref 8.3–10.6)
CHLORIDE SERPL-SCNC: 102 MMOL/L (ref 99–110)
CO2 SERPL-SCNC: 25 MMOL/L (ref 21–32)
CREAT SERPL-MCNC: 1.1 MG/DL (ref 0.8–1.3)
DEPRECATED RDW RBC AUTO: 14.4 % (ref 12.4–15.4)
ECHO BSA: 2.11 M2
EKG ATRIAL RATE: 91 BPM
EKG DIAGNOSIS: NORMAL
EKG P AXIS: 58 DEGREES
EKG P-R INTERVAL: 166 MS
EKG Q-T INTERVAL: 380 MS
EKG QRS DURATION: 108 MS
EKG QTC CALCULATION (BAZETT): 467 MS
EKG R AXIS: -45 DEGREES
EKG T AXIS: 98 DEGREES
EKG VENTRICULAR RATE: 91 BPM
GFR SERPLBLD CREATININE-BSD FMLA CKD-EPI: 73 ML/MIN/{1.73_M2}
GLUCOSE SERPL-MCNC: 145 MG/DL (ref 70–99)
HCT VFR BLD AUTO: 46.8 % (ref 40.5–52.5)
HGB BLD-MCNC: 15.1 G/DL (ref 13.5–17.5)
MCH RBC QN AUTO: 26.4 PG (ref 26–34)
MCHC RBC AUTO-ENTMCNC: 32.2 G/DL (ref 31–36)
MCV RBC AUTO: 82 FL (ref 80–100)
PLATELET # BLD AUTO: 255 K/UL (ref 135–450)
PMV BLD AUTO: 7.9 FL (ref 5–10.5)
POTASSIUM SERPL-SCNC: 4 MMOL/L (ref 3.5–5.1)
RBC # BLD AUTO: 5.71 M/UL (ref 4.2–5.9)
SODIUM SERPL-SCNC: 141 MMOL/L (ref 136–145)
WBC # BLD AUTO: 8 K/UL (ref 4–11)

## 2024-09-06 PROCEDURE — 6370000000 HC RX 637 (ALT 250 FOR IP): Performed by: INTERNAL MEDICINE

## 2024-09-06 PROCEDURE — 7100000010 HC PHASE II RECOVERY - FIRST 15 MIN: Performed by: INTERNAL MEDICINE

## 2024-09-06 PROCEDURE — 7100000011 HC PHASE II RECOVERY - ADDTL 15 MIN: Performed by: INTERNAL MEDICINE

## 2024-09-06 PROCEDURE — C1894 INTRO/SHEATH, NON-LASER: HCPCS | Performed by: INTERNAL MEDICINE

## 2024-09-06 PROCEDURE — 6360000004 HC RX CONTRAST MEDICATION: Performed by: INTERNAL MEDICINE

## 2024-09-06 PROCEDURE — C1725 CATH, TRANSLUMIN NON-LASER: HCPCS | Performed by: INTERNAL MEDICINE

## 2024-09-06 PROCEDURE — 99153 MOD SED SAME PHYS/QHP EA: CPT | Performed by: INTERNAL MEDICINE

## 2024-09-06 PROCEDURE — 93010 ELECTROCARDIOGRAM REPORT: CPT | Performed by: INTERNAL MEDICINE

## 2024-09-06 PROCEDURE — 6360000002 HC RX W HCPCS: Performed by: INTERNAL MEDICINE

## 2024-09-06 PROCEDURE — C1874 STENT, COATED/COV W/DEL SYS: HCPCS | Performed by: INTERNAL MEDICINE

## 2024-09-06 PROCEDURE — C1769 GUIDE WIRE: HCPCS | Performed by: INTERNAL MEDICINE

## 2024-09-06 PROCEDURE — 92928 PRQ TCAT PLMT NTRAC ST 1 LES: CPT | Performed by: INTERNAL MEDICINE

## 2024-09-06 PROCEDURE — C1760 CLOSURE DEV, VASC: HCPCS | Performed by: INTERNAL MEDICINE

## 2024-09-06 PROCEDURE — 99152 MOD SED SAME PHYS/QHP 5/>YRS: CPT | Performed by: INTERNAL MEDICINE

## 2024-09-06 PROCEDURE — C1887 CATHETER, GUIDING: HCPCS | Performed by: INTERNAL MEDICINE

## 2024-09-06 PROCEDURE — 2580000003 HC RX 258: Performed by: INTERNAL MEDICINE

## 2024-09-06 PROCEDURE — 85027 COMPLETE CBC AUTOMATED: CPT

## 2024-09-06 PROCEDURE — 92929 HC PRQ CARD STENT W/ANGIO ADDL: CPT | Performed by: INTERNAL MEDICINE

## 2024-09-06 PROCEDURE — 80048 BASIC METABOLIC PNL TOTAL CA: CPT

## 2024-09-06 PROCEDURE — 93005 ELECTROCARDIOGRAM TRACING: CPT | Performed by: INTERNAL MEDICINE

## 2024-09-06 PROCEDURE — 2500000003 HC RX 250 WO HCPCS: Performed by: INTERNAL MEDICINE

## 2024-09-06 PROCEDURE — 2709999900 HC NON-CHARGEABLE SUPPLY: Performed by: INTERNAL MEDICINE

## 2024-09-06 PROCEDURE — 36415 COLL VENOUS BLD VENIPUNCTURE: CPT

## 2024-09-06 DEVICE — EVEROLIMUS-ELUTING PLATINUM CHROMIUM CORONARY STENT SYSTEM
Type: IMPLANTABLE DEVICE | Status: FUNCTIONAL
Brand: SYNERGY™ XD

## 2024-09-06 RX ORDER — MIDAZOLAM HYDROCHLORIDE 1 MG/ML
INJECTION INTRAMUSCULAR; INTRAVENOUS PRN
Status: DISCONTINUED | OUTPATIENT
Start: 2024-09-06 | End: 2024-09-06 | Stop reason: HOSPADM

## 2024-09-06 RX ORDER — IOPAMIDOL 755 MG/ML
INJECTION, SOLUTION INTRAVASCULAR PRN
Status: DISCONTINUED | OUTPATIENT
Start: 2024-09-06 | End: 2024-09-06 | Stop reason: HOSPADM

## 2024-09-06 RX ORDER — SODIUM CHLORIDE 9 MG/ML
INJECTION, SOLUTION INTRAVENOUS PRN
OUTPATIENT
Start: 2024-09-06

## 2024-09-06 RX ORDER — NITROGLYCERIN 20 MG/100ML
INJECTION INTRAVENOUS CONTINUOUS PRN
Status: COMPLETED | OUTPATIENT
Start: 2024-09-06 | End: 2024-09-06

## 2024-09-06 RX ORDER — SODIUM CHLORIDE 0.9 % (FLUSH) 0.9 %
5-40 SYRINGE (ML) INJECTION EVERY 12 HOURS SCHEDULED
OUTPATIENT
Start: 2024-09-06

## 2024-09-06 RX ORDER — SODIUM CHLORIDE 0.9 % (FLUSH) 0.9 %
5-40 SYRINGE (ML) INJECTION PRN
OUTPATIENT
Start: 2024-09-06

## 2024-09-06 RX ORDER — BIVALIRUDIN 250 MG/5ML
INJECTION, POWDER, LYOPHILIZED, FOR SOLUTION INTRAVENOUS PRN
Status: DISCONTINUED | OUTPATIENT
Start: 2024-09-06 | End: 2024-09-06 | Stop reason: HOSPADM

## 2024-09-06 RX ORDER — LIDOCAINE HYDROCHLORIDE 10 MG/ML
INJECTION, SOLUTION INFILTRATION; PERINEURAL PRN
Status: DISCONTINUED | OUTPATIENT
Start: 2024-09-06 | End: 2024-09-06 | Stop reason: HOSPADM

## 2024-09-06 RX ORDER — FUROSEMIDE 10 MG/ML
INJECTION INTRAMUSCULAR; INTRAVENOUS PRN
Status: DISCONTINUED | OUTPATIENT
Start: 2024-09-06 | End: 2024-09-06 | Stop reason: HOSPADM

## 2024-09-06 RX ORDER — CLOPIDOGREL BISULFATE 75 MG/1
75 TABLET ORAL DAILY
Qty: 90 TABLET | Refills: 3 | Status: SHIPPED | OUTPATIENT
Start: 2024-09-06

## 2024-09-06 RX ORDER — ACETAMINOPHEN 325 MG/1
650 TABLET ORAL EVERY 4 HOURS PRN
OUTPATIENT
Start: 2024-09-06

## 2024-09-06 RX ORDER — CLOPIDOGREL 300 MG/1
600 TABLET, FILM COATED ORAL ONCE
Status: COMPLETED | OUTPATIENT
Start: 2024-09-06 | End: 2024-09-06

## 2024-09-06 RX ORDER — FENTANYL CITRATE 50 UG/ML
INJECTION, SOLUTION INTRAMUSCULAR; INTRAVENOUS PRN
Status: DISCONTINUED | OUTPATIENT
Start: 2024-09-06 | End: 2024-09-06 | Stop reason: HOSPADM

## 2024-09-06 RX ADMIN — CLOPIDOGREL BISULFATE 600 MG: 300 TABLET, FILM COATED ORAL at 09:14

## 2024-09-06 NOTE — H&P
[facility]     Outpatient Follow Up Note    CHIEF COMPLAINT / HPI: Hospital Follow Up secondary to Acute systolic congestive heart failure, cardiomyopathy, coronary artery disease/ hypertension/ and hyperlipidemia     Hospital record has been reviewed  Hospital Course progressed as follows:   68 M  w HTN admitted with acute chf, found to have  severely reduced EF  and global hypokinesis, LHC showed multivessel diseased. Diuresed well with iv lasix and started on GDMT  with asa,b blocker, jardiance  but no  ace/arb or anri d/t low/borderline BP. Had di/w both cardio and CT sx about staged pci  vs   cabg. Pt initially  agreed on pci but later refused any intervention despite detailed discussion about  high possibility of morbidity and mortality. Life vest was offered  but refused that as well despite clear discussion of risk of fatal arrythmias  with untreated ischemia. Pt will f/u with  his cardiologist Dr Fu for further discussion       Ariel Garcia is 68 y.o. male who presents today for a routine follow up after a recent hospitalization related to the above mentioned issues.  Subjective:   Since the time of discharge, the patient admits their symptoms have improved.       At today's OV patient denies any chest pain, palpitations, SOB, dyspnea with exertion, dizziness, or edema. Patient is very tearful at today's OV. He stated I want to live for my granddaughter. He agree on proceeding with coronary PCI.    With regard to medication therapy the patient has been compliant with prescribed regimen. They have tolerated therapy to date.     Past Medical History:   Diagnosis Date    JERO (Hamm's esophagus)     Ganglion cyst     Heart murmur     Hypertension     Scoliosis      Social History:    Social History     Tobacco Use   Smoking Status Every Day    Current packs/day: 0.50    Average packs/day: 0.5 packs/day for 20.0 years (10.0 ttl pk-yrs)    Types: Cigarettes   Smokeless Tobacco Not on file  instructed to call the office with a weight gain: > 3 lbs over night or 5 lbs in one week; swelling, SOB/orthopnea/PND    Thank you for allowing to us to participate in the care of Arielmarizol Ratliffsamanta.      [facility]   Criss Ritchie CNP    I have reviewed the history and physical and examined the patient and find no relevant changes in the history and physical exam, including heart and lung sounds.  I have reviewed with the patient and/or family the risks, benefits, and alternatives to the procedure.    Based on these findings I recommend staged PCI.  Risks, benefits, expectations, and alternative treatments were discussed.  Questions appropriately answered.  Ariel Wilsonvasu agrees to proceed and verbalized understanding.   Load with plavix    Melvin Corral MD 9/6/2024 11:08 AM

## 2024-09-06 NOTE — DISCHARGE INSTRUCTIONS
LEFT HEART CATHETERIZATION    Care of your puncture site:  Remove bandage 24 hours after the procedure.  May shower in 24 hours but do not sit in a bathtub/pool of water for 5 days or until the wound is healed.  Inspect the site daily and gently clean using soap and water while standing in the shower.  Dry thoroughly and apply a Band-Aid that covers the entire site. Do not apply powder or lotion.    Normal Observations:  Soreness or tenderness which may last one week.  Mild oozing from the incision site.  Possible bruising that could last 2 weeks.    Activity:  You may resume driving 24 hours following the procedure.  You may resume normal activity in 5 days or after the wound heals.  Avoid lifting more than 10 pounds for 5 days or until the wound heals.  Avoid strenuous exercise or activity for 1 week.    Nutrition:  Regular diet  Drink at least 8 to 10 glasses of decaffeinated, non-alcoholic fluid for the next 24 hours to flush the x-ray dye used for your angiogram out of your body.    Call your doctor immediately if your condition worsens, for any other concerns, for a follow-up appointment or if you experience any of the following:  Significant bleeding that does not stop after 10 minutes of applying firm pressure on the puncture site.  Increased swelling on the groin or leg.  Unusual pain, numbness, or tingling of the groin or down the leg.  Any signs of infection such as: redness, yellow drainage at the site, swelling or pain.

## 2024-09-18 ENCOUNTER — HOSPITAL ENCOUNTER (OUTPATIENT)
Age: 69
Discharge: HOME | End: 2024-09-18
Payer: MEDICARE

## 2024-09-18 ENCOUNTER — OFFICE VISIT (OUTPATIENT)
Dept: CARDIOLOGY CLINIC | Age: 69
End: 2024-09-18
Payer: MEDICARE

## 2024-09-18 VITALS
HEART RATE: 130 BPM | DIASTOLIC BLOOD PRESSURE: 78 MMHG | HEIGHT: 71 IN | OXYGEN SATURATION: 98 % | SYSTOLIC BLOOD PRESSURE: 115 MMHG | WEIGHT: 186.2 LBS | BODY MASS INDEX: 26.07 KG/M2

## 2024-09-18 DIAGNOSIS — I50.23 ACUTE ON CHRONIC SYSTOLIC CONGESTIVE HEART FAILURE (HCC): Primary | ICD-10-CM

## 2024-09-18 DIAGNOSIS — I50.23 ACUTE ON CHRONIC SYSTOLIC CONGESTIVE HEART FAILURE (HCC): ICD-10-CM

## 2024-09-18 DIAGNOSIS — I10 ESSENTIAL HYPERTENSION: Chronic | ICD-10-CM

## 2024-09-18 DIAGNOSIS — E78.2 MIXED HYPERLIPIDEMIA: ICD-10-CM

## 2024-09-18 DIAGNOSIS — I25.118 CORONARY ARTERY DISEASE OF NATIVE ARTERY OF NATIVE HEART WITH STABLE ANGINA PECTORIS (HCC): Chronic | ICD-10-CM

## 2024-09-18 LAB
ALBUMIN SERPL-MCNC: 4 G/DL (ref 3.4–5)
ALBUMIN/GLOB SERPL: 1.4 {RATIO} (ref 1.1–2.2)
ALP SERPL-CCNC: 77 U/L (ref 40–129)
ALT SERPL-CCNC: 19 U/L (ref 10–40)
ANION GAP SERPL CALCULATED.3IONS-SCNC: 15 MMOL/L (ref 3–16)
AST SERPL-CCNC: 22 U/L (ref 15–37)
BILIRUB SERPL-MCNC: 1.4 MG/DL (ref 0–1)
BUN SERPL-MCNC: 26 MG/DL (ref 7–20)
CALCIUM SERPL-MCNC: 9.5 MG/DL (ref 8.3–10.6)
CHLORIDE SERPL-SCNC: 102 MMOL/L (ref 99–110)
CO2 SERPL-SCNC: 22 MMOL/L (ref 21–32)
CREAT SERPL-MCNC: 1.3 MG/DL (ref 0.8–1.3)
GFR SERPLBLD CREATININE-BSD FMLA CKD-EPI: 60 ML/MIN/{1.73_M2}
GLUCOSE SERPL-MCNC: 136 MG/DL (ref 70–99)
POTASSIUM SERPL-SCNC: 4.2 MMOL/L (ref 3.5–5.1)
PROT SERPL-MCNC: 6.9 G/DL (ref 6.4–8.2)
SODIUM SERPL-SCNC: 139 MMOL/L (ref 136–145)

## 2024-09-18 PROCEDURE — 36415 COLL VENOUS BLD VENIPUNCTURE: CPT

## 2024-09-18 PROCEDURE — 3078F DIAST BP <80 MM HG: CPT | Performed by: NURSE PRACTITIONER

## 2024-09-18 PROCEDURE — 3074F SYST BP LT 130 MM HG: CPT | Performed by: NURSE PRACTITIONER

## 2024-09-18 PROCEDURE — 80053 COMPREHEN METABOLIC PANEL: CPT

## 2024-09-18 PROCEDURE — 1123F ACP DISCUSS/DSCN MKR DOCD: CPT | Performed by: NURSE PRACTITIONER

## 2024-09-18 PROCEDURE — 99214 OFFICE O/P EST MOD 30 MIN: CPT | Performed by: NURSE PRACTITIONER

## 2024-09-24 NOTE — PROGRESS NOTES
[facility]   Cardiac f/up    Referring Provider:  Abraham Boothe DO     Chief Complaint   Patient presents with    Follow-up     3 month f/u have lifevest    Foot Swelling     Pt complaints of Rt foot infection       History of Present Illness:  Mr. Garcia has a history of coronary artery disease s/p multi-stenting 9/2024, systolic congestive heart failure, cardiomyopathy,  hypertension, and hyperlipidemia. I saw him back in 2011 at which time he underwent a cardiac catheterization for chest pain. On presentation, he had a moderate LAD lesion and moderate diagonal lesion. He was advised to follow up regularly with me, but never sought medical attention after that. He is a former smoker.     -In April 2024, he had about a 3- to 4-day episode where he was extremely short of breath; he was experiencing discomfort in his chest. At that time, he did not seek medical attention. Since that time, he had been progressively more short of breath.   -8/19/24, his dyspnea acutely worsened with marked shortness of breath as well as lower extremity swelling. For that reason, he came to the emergency room. Chest x-ray and CT scan were consistent with acute congestive heart failure. Lab work showed his proBNP to be 16,000 with troponin high sensitivity being 34. He also was noted to be hypertensive.   -He was admitted to [facility] same day with acute chf, found to have severely reduced EF, and golbal hypokinesis. Cardiology was consulted, and he underwent a LHC that showed multivessel disease. Diuresed well with iv lasix and started on GDMT with asa,b blocker, jardiance but no ace/arb or anri d/t low/borderline BP. Had di/w both cardio and CT sx about staged pci vs cabg. Pt initially agreed on pci but later refused any intervention despite detailed discussion about high possibility of morbidity and mortality. Life vest was offered but refused that as well despite clear discussion of risk of fatal arrythmias with

## 2024-10-01 ENCOUNTER — HOSPITAL ENCOUNTER (OUTPATIENT)
Age: 69
Discharge: HOME | End: 2024-10-01
Payer: MEDICARE

## 2024-10-01 LAB
EST. AVERAGE GLUCOSE BLD GHB EST-MCNC: 162.8 MG/DL
HBA1C MFR BLD: 7.3 %
PSA SERPL DL<=0.01 NG/ML-MCNC: 0.72 NG/ML (ref 0–4)

## 2024-10-01 PROCEDURE — 36415 COLL VENOUS BLD VENIPUNCTURE: CPT

## 2024-10-01 PROCEDURE — 84153 ASSAY OF PSA TOTAL: CPT

## 2024-10-01 PROCEDURE — 83036 HEMOGLOBIN GLYCOSYLATED A1C: CPT

## 2024-10-05 ENCOUNTER — HOSPITAL ENCOUNTER (INPATIENT)
Age: 69
LOS: 5 days | Discharge: HOME | End: 2024-10-11
Attending: STUDENT IN AN ORGANIZED HEALTH CARE EDUCATION/TRAINING PROGRAM | Admitting: INTERNAL MEDICINE
Payer: MEDICARE

## 2024-10-05 DIAGNOSIS — I65.29 STENOSIS OF CAROTID ARTERY, UNSPECIFIED LATERALITY: ICD-10-CM

## 2024-10-05 DIAGNOSIS — I48.92 NEW ONSET ATRIAL FLUTTER (HCC): ICD-10-CM

## 2024-10-05 DIAGNOSIS — R60.0 UNILATERAL EDEMA OF LOWER EXTREMITY: ICD-10-CM

## 2024-10-05 DIAGNOSIS — R60.0 LEG EDEMA, RIGHT: ICD-10-CM

## 2024-10-05 DIAGNOSIS — E87.70 HYPERVOLEMIA, UNSPECIFIED HYPERVOLEMIA TYPE: Primary | ICD-10-CM

## 2024-10-05 DIAGNOSIS — J90 BILATERAL PLEURAL EFFUSION: ICD-10-CM

## 2024-10-05 PROCEDURE — 99285 EMERGENCY DEPT VISIT HI MDM: CPT

## 2024-10-05 ASSESSMENT — PAIN DESCRIPTION - DESCRIPTORS: DESCRIPTORS: STABBING

## 2024-10-05 ASSESSMENT — PAIN DESCRIPTION - ORIENTATION: ORIENTATION: RIGHT;LEFT

## 2024-10-05 ASSESSMENT — PAIN - FUNCTIONAL ASSESSMENT: PAIN_FUNCTIONAL_ASSESSMENT: 0-10

## 2024-10-05 ASSESSMENT — PAIN DESCRIPTION - LOCATION: LOCATION: LEG

## 2024-10-05 ASSESSMENT — LIFESTYLE VARIABLES
HOW MANY STANDARD DRINKS CONTAINING ALCOHOL DO YOU HAVE ON A TYPICAL DAY: PATIENT DOES NOT DRINK
HOW OFTEN DO YOU HAVE A DRINK CONTAINING ALCOHOL: NEVER

## 2024-10-05 ASSESSMENT — PAIN SCALES - GENERAL: PAINLEVEL_OUTOF10: 10

## 2024-10-06 ENCOUNTER — APPOINTMENT (OUTPATIENT)
Dept: GENERAL RADIOLOGY | Age: 69
End: 2024-10-06
Payer: MEDICARE

## 2024-10-06 ENCOUNTER — APPOINTMENT (OUTPATIENT)
Dept: CT IMAGING | Age: 69
End: 2024-10-06
Payer: MEDICARE

## 2024-10-06 PROBLEM — I50.23 ACUTE ON CHRONIC SYSTOLIC CHF (CONGESTIVE HEART FAILURE) (HCC): Status: ACTIVE | Noted: 2024-10-06

## 2024-10-06 LAB
ALBUMIN SERPL-MCNC: 3.9 G/DL (ref 3.4–5)
ALBUMIN/GLOB SERPL: 1.3 {RATIO} (ref 1.1–2.2)
ALP SERPL-CCNC: 125 U/L (ref 40–129)
ALT SERPL-CCNC: 32 U/L (ref 10–40)
ANION GAP SERPL CALCULATED.3IONS-SCNC: 17 MMOL/L (ref 3–16)
AST SERPL-CCNC: 26 U/L (ref 15–37)
BASOPHILS # BLD: 0.2 K/UL (ref 0–0.2)
BASOPHILS NFR BLD: 3.2 %
BILIRUB SERPL-MCNC: 1.1 MG/DL (ref 0–1)
BUN SERPL-MCNC: 36 MG/DL (ref 7–20)
CALCIUM SERPL-MCNC: 8.8 MG/DL (ref 8.3–10.6)
CHLORIDE SERPL-SCNC: 104 MMOL/L (ref 99–110)
CO2 SERPL-SCNC: 19 MMOL/L (ref 21–32)
CREAT SERPL-MCNC: 1.6 MG/DL (ref 0.8–1.3)
DEPRECATED RDW RBC AUTO: 17.1 % (ref 12.4–15.4)
EKG ATRIAL RATE: 249 BPM
EKG DIAGNOSIS: NORMAL
EKG Q-T INTERVAL: 372 MS
EKG QRS DURATION: 100 MS
EKG QTC CALCULATION (BAZETT): 474 MS
EKG R AXIS: -36 DEGREES
EKG T AXIS: 119 DEGREES
EKG VENTRICULAR RATE: 98 BPM
EOSINOPHIL # BLD: 0.3 K/UL (ref 0–0.6)
EOSINOPHIL NFR BLD: 4.3 %
GFR SERPLBLD CREATININE-BSD FMLA CKD-EPI: 46 ML/MIN/{1.73_M2}
GLUCOSE SERPL-MCNC: 155 MG/DL (ref 70–99)
HCT VFR BLD AUTO: 42.4 % (ref 40.5–52.5)
HGB BLD-MCNC: 13.5 G/DL (ref 13.5–17.5)
LACTATE BLDV-SCNC: 1 MMOL/L (ref 0.4–1.9)
LACTATE BLDV-SCNC: 1.8 MMOL/L (ref 0.4–1.9)
LYMPHOCYTES # BLD: 1.4 K/UL (ref 1–5.1)
LYMPHOCYTES NFR BLD: 22.6 %
MCH RBC QN AUTO: 26 PG (ref 26–34)
MCHC RBC AUTO-ENTMCNC: 32 G/DL (ref 31–36)
MCV RBC AUTO: 81.3 FL (ref 80–100)
MONOCYTES # BLD: 0.6 K/UL (ref 0–1.3)
MONOCYTES NFR BLD: 9.3 %
NEUTROPHILS # BLD: 3.9 K/UL (ref 1.7–7.7)
NEUTROPHILS NFR BLD: 60.6 %
NT-PROBNP SERPL-MCNC: ABNORMAL PG/ML (ref 0–124)
PLATELET # BLD AUTO: 190 K/UL (ref 135–450)
PMV BLD AUTO: 8.5 FL (ref 5–10.5)
POTASSIUM SERPL-SCNC: 4.1 MMOL/L (ref 3.5–5.1)
PROCALCITONIN SERPL IA-MCNC: 0.06 NG/ML (ref 0–0.15)
PROT SERPL-MCNC: 6.8 G/DL (ref 6.4–8.2)
RBC # BLD AUTO: 5.21 M/UL (ref 4.2–5.9)
SODIUM SERPL-SCNC: 140 MMOL/L (ref 136–145)
TROPONIN, HIGH SENSITIVITY: 32 NG/L (ref 0–22)
TROPONIN, HIGH SENSITIVITY: 34 NG/L (ref 0–22)
WBC # BLD AUTO: 6.4 K/UL (ref 4–11)

## 2024-10-06 PROCEDURE — 6360000002 HC RX W HCPCS: Performed by: INTERNAL MEDICINE

## 2024-10-06 PROCEDURE — 71260 CT THORAX DX C+: CPT

## 2024-10-06 PROCEDURE — 80053 COMPREHEN METABOLIC PANEL: CPT

## 2024-10-06 PROCEDURE — 6360000002 HC RX W HCPCS: Performed by: STUDENT IN AN ORGANIZED HEALTH CARE EDUCATION/TRAINING PROGRAM

## 2024-10-06 PROCEDURE — 93010 ELECTROCARDIOGRAM REPORT: CPT | Performed by: INTERNAL MEDICINE

## 2024-10-06 PROCEDURE — 2060000000 HC ICU INTERMEDIATE R&B

## 2024-10-06 PROCEDURE — 2580000003 HC RX 258: Performed by: INTERNAL MEDICINE

## 2024-10-06 PROCEDURE — 83605 ASSAY OF LACTIC ACID: CPT

## 2024-10-06 PROCEDURE — 84484 ASSAY OF TROPONIN QUANT: CPT

## 2024-10-06 PROCEDURE — 87040 BLOOD CULTURE FOR BACTERIA: CPT

## 2024-10-06 PROCEDURE — 6360000004 HC RX CONTRAST MEDICATION: Performed by: STUDENT IN AN ORGANIZED HEALTH CARE EDUCATION/TRAINING PROGRAM

## 2024-10-06 PROCEDURE — 84145 PROCALCITONIN (PCT): CPT

## 2024-10-06 PROCEDURE — 85025 COMPLETE CBC W/AUTO DIFF WBC: CPT

## 2024-10-06 PROCEDURE — 96374 THER/PROPH/DIAG INJ IV PUSH: CPT

## 2024-10-06 PROCEDURE — 93005 ELECTROCARDIOGRAM TRACING: CPT | Performed by: STUDENT IN AN ORGANIZED HEALTH CARE EDUCATION/TRAINING PROGRAM

## 2024-10-06 PROCEDURE — 36415 COLL VENOUS BLD VENIPUNCTURE: CPT

## 2024-10-06 PROCEDURE — 83880 ASSAY OF NATRIURETIC PEPTIDE: CPT

## 2024-10-06 PROCEDURE — 71045 X-RAY EXAM CHEST 1 VIEW: CPT

## 2024-10-06 PROCEDURE — 6370000000 HC RX 637 (ALT 250 FOR IP): Performed by: INTERNAL MEDICINE

## 2024-10-06 PROCEDURE — 99223 1ST HOSP IP/OBS HIGH 75: CPT | Performed by: INTERNAL MEDICINE

## 2024-10-06 RX ORDER — ONDANSETRON 4 MG/1
4 TABLET, ORALLY DISINTEGRATING ORAL EVERY 8 HOURS PRN
Status: DISCONTINUED | OUTPATIENT
Start: 2024-10-06 | End: 2024-10-11 | Stop reason: HOSPADM

## 2024-10-06 RX ORDER — FUROSEMIDE 10 MG/ML
60 INJECTION INTRAMUSCULAR; INTRAVENOUS ONCE
Status: COMPLETED | OUTPATIENT
Start: 2024-10-06 | End: 2024-10-06

## 2024-10-06 RX ORDER — ONDANSETRON 2 MG/ML
4 INJECTION INTRAMUSCULAR; INTRAVENOUS EVERY 6 HOURS PRN
Status: DISCONTINUED | OUTPATIENT
Start: 2024-10-06 | End: 2024-10-11 | Stop reason: HOSPADM

## 2024-10-06 RX ORDER — PANTOPRAZOLE SODIUM 40 MG/1
40 TABLET, DELAYED RELEASE ORAL
Status: DISCONTINUED | OUTPATIENT
Start: 2024-10-06 | End: 2024-10-11 | Stop reason: HOSPADM

## 2024-10-06 RX ORDER — CARVEDILOL 3.12 MG/1
3.12 TABLET ORAL 2 TIMES DAILY WITH MEALS
Status: DISCONTINUED | OUTPATIENT
Start: 2024-10-06 | End: 2024-10-11 | Stop reason: HOSPADM

## 2024-10-06 RX ORDER — ENOXAPARIN SODIUM 100 MG/ML
40 INJECTION SUBCUTANEOUS DAILY
Status: DISCONTINUED | OUTPATIENT
Start: 2024-10-06 | End: 2024-10-08

## 2024-10-06 RX ORDER — POTASSIUM CHLORIDE 7.45 MG/ML
10 INJECTION INTRAVENOUS PRN
Status: DISCONTINUED | OUTPATIENT
Start: 2024-10-06 | End: 2024-10-11 | Stop reason: HOSPADM

## 2024-10-06 RX ORDER — ATORVASTATIN CALCIUM 80 MG/1
80 TABLET, FILM COATED ORAL NIGHTLY
Status: DISCONTINUED | OUTPATIENT
Start: 2024-10-06 | End: 2024-10-11 | Stop reason: HOSPADM

## 2024-10-06 RX ORDER — CLOPIDOGREL BISULFATE 75 MG/1
75 TABLET ORAL DAILY
Status: DISCONTINUED | OUTPATIENT
Start: 2024-10-06 | End: 2024-10-11 | Stop reason: HOSPADM

## 2024-10-06 RX ORDER — ACETAMINOPHEN 650 MG/1
650 SUPPOSITORY RECTAL EVERY 6 HOURS PRN
Status: DISCONTINUED | OUTPATIENT
Start: 2024-10-06 | End: 2024-10-11 | Stop reason: HOSPADM

## 2024-10-06 RX ORDER — ASPIRIN 81 MG/1
81 TABLET ORAL DAILY
Status: DISCONTINUED | OUTPATIENT
Start: 2024-10-06 | End: 2024-10-08

## 2024-10-06 RX ORDER — ACETAMINOPHEN 325 MG/1
650 TABLET ORAL EVERY 6 HOURS PRN
Status: DISCONTINUED | OUTPATIENT
Start: 2024-10-06 | End: 2024-10-11 | Stop reason: HOSPADM

## 2024-10-06 RX ORDER — MORPHINE SULFATE 4 MG/ML
4 INJECTION, SOLUTION INTRAMUSCULAR; INTRAVENOUS ONCE
Status: DISCONTINUED | OUTPATIENT
Start: 2024-10-06 | End: 2024-10-06

## 2024-10-06 RX ORDER — POLYETHYLENE GLYCOL 3350 17 G/17G
17 POWDER, FOR SOLUTION ORAL DAILY PRN
Status: DISCONTINUED | OUTPATIENT
Start: 2024-10-06 | End: 2024-10-11 | Stop reason: HOSPADM

## 2024-10-06 RX ORDER — SODIUM CHLORIDE 0.9 % (FLUSH) 0.9 %
5-40 SYRINGE (ML) INJECTION EVERY 12 HOURS SCHEDULED
Status: DISCONTINUED | OUTPATIENT
Start: 2024-10-06 | End: 2024-10-11 | Stop reason: HOSPADM

## 2024-10-06 RX ORDER — IOPAMIDOL 755 MG/ML
75 INJECTION, SOLUTION INTRAVASCULAR
Status: COMPLETED | OUTPATIENT
Start: 2024-10-06 | End: 2024-10-06

## 2024-10-06 RX ORDER — POTASSIUM CHLORIDE 1500 MG/1
40 TABLET, EXTENDED RELEASE ORAL PRN
Status: DISCONTINUED | OUTPATIENT
Start: 2024-10-06 | End: 2024-10-11 | Stop reason: HOSPADM

## 2024-10-06 RX ORDER — SODIUM CHLORIDE 0.9 % (FLUSH) 0.9 %
5-40 SYRINGE (ML) INJECTION PRN
Status: DISCONTINUED | OUTPATIENT
Start: 2024-10-06 | End: 2024-10-11 | Stop reason: HOSPADM

## 2024-10-06 RX ORDER — FUROSEMIDE 10 MG/ML
40 INJECTION INTRAMUSCULAR; INTRAVENOUS 3 TIMES DAILY
Status: DISCONTINUED | OUTPATIENT
Start: 2024-10-06 | End: 2024-10-06

## 2024-10-06 RX ORDER — MAGNESIUM SULFATE IN WATER 40 MG/ML
2000 INJECTION, SOLUTION INTRAVENOUS PRN
Status: DISCONTINUED | OUTPATIENT
Start: 2024-10-06 | End: 2024-10-11 | Stop reason: HOSPADM

## 2024-10-06 RX ORDER — SODIUM CHLORIDE 9 MG/ML
INJECTION, SOLUTION INTRAVENOUS PRN
Status: DISCONTINUED | OUTPATIENT
Start: 2024-10-06 | End: 2024-10-11 | Stop reason: HOSPADM

## 2024-10-06 RX ADMIN — FUROSEMIDE 10 MG/HR: 10 INJECTION, SOLUTION INTRAMUSCULAR; INTRAVENOUS at 18:09

## 2024-10-06 RX ADMIN — CLOPIDOGREL BISULFATE 75 MG: 75 TABLET ORAL at 09:47

## 2024-10-06 RX ADMIN — CARVEDILOL 3.12 MG: 3.12 TABLET, FILM COATED ORAL at 09:48

## 2024-10-06 RX ADMIN — FUROSEMIDE 40 MG: 10 INJECTION, SOLUTION INTRAMUSCULAR; INTRAVENOUS at 11:40

## 2024-10-06 RX ADMIN — SODIUM CHLORIDE, PRESERVATIVE FREE 10 ML: 5 INJECTION INTRAVENOUS at 09:49

## 2024-10-06 RX ADMIN — ATORVASTATIN CALCIUM 80 MG: 80 TABLET, FILM COATED ORAL at 20:38

## 2024-10-06 RX ADMIN — EMPAGLIFLOZIN 10 MG: 10 TABLET, FILM COATED ORAL at 09:47

## 2024-10-06 RX ADMIN — IOPAMIDOL 75 ML: 755 INJECTION, SOLUTION INTRAVENOUS at 01:50

## 2024-10-06 RX ADMIN — FUROSEMIDE 60 MG: 10 INJECTION, SOLUTION INTRAMUSCULAR; INTRAVENOUS at 03:44

## 2024-10-06 RX ADMIN — ASPIRIN 81 MG: 81 TABLET, COATED ORAL at 09:47

## 2024-10-06 RX ADMIN — CARVEDILOL 3.12 MG: 3.12 TABLET, FILM COATED ORAL at 18:04

## 2024-10-06 RX ADMIN — PANTOPRAZOLE SODIUM 40 MG: 40 TABLET, DELAYED RELEASE ORAL at 09:47

## 2024-10-06 NOTE — PROGRESS NOTES
Assessment completed and documented.  Denies pain.  SOB with activity.  Pt refused lovenox.  Education given including reason Lovenox is given and side effects.  Verbalized understanding.  Family at bedside.

## 2024-10-06 NOTE — PROGRESS NOTES
Lasix 40 mg IV every 8 hours on admission  - Cardiology evaluated, continue to diurese  - Continue carvedilol 3.125 mg  - Hold Jardiance given BETINA   - No ACE/ARB/ARNi secondary CKD and hypotension    CAD   - S/p angioplasty and stenting of RCA/LAD  and diagonal   9/16/2024  - Denies chest pain  - Continue DAPT, statin    BETINA on CKD  - Creatinine 1.6 on presentation, compared to 1.3 just last month  - Volume overloaded, pleural effusions and ascites noted on CT scan  - Possible CRS  - Consult nephrology  - Avoid nephrotoxins  - Monitor closely given need for diuresis, IV contrast received in ER  - BMP in am    New onset atrial flutter  - Presented in atrial flutter with   - Rate now controlled  - Continue carvedilol  - EP evaluated, high risk [id number] score and AC recommended, however, currently on DAPT and triple therapy is not recommended due to high risk of bleeding  - Interventional cardiology to see with regards to duration of DAPT and when AC can be added    Tobacco use  - Encouraged smoking cessation        Diet: ADULT DIET; Regular; Low Sodium (2 gm)  Code:Full Code  DVT PPX: enoxaparin      Angie Rouse, APRN - CNP   10/6/2024 10:22 AM

## 2024-10-06 NOTE — ED PROVIDER NOTES
[facility] EMERGENCY DEPARTMENT  EMERGENCY DEPARTMENT ENCOUNTER        Pt Name: Ariel Garcia  MRN: [id number]  Birthdate 1955  Date of evaluation: 10/5/2024  Provider: RAMONA Ware - WILLIS  PCP: Abraham Boothe DO  Note Started: 12:39 AM EDT 10/6/24       I have seen and evaluated this patient with my supervising physician Danish Toth MD.      CHIEF COMPLAINT       Chief Complaint   Patient presents with    Leg Swelling     Pt to ED via [facility] from home with c/o lower extremity edema and shortness of breath that started about a week ago. Pt states he had 5 stents placed 2 weeks ago.        HISTORY OF PRESENT ILLNESS: 1 or more Elements     History from : Patient and Family wife and daughter    Limitations to history : None    Ariel Garcia is a 68 y.o. male who presents to the emergency department with complaint of painful bilateral leg swelling.  Right greater than left.  The patient is a poor historian and is agitated at baseline.  When he is asked a question, he tells me that he is a  and NP Sharda recently dried of coronary artery disease.  Goes on to tell me about his father's time in the war with gangrene.  Tells me that he is not allergic to penicillin.  Patient's wife states that he is at baseline.    Patient did have coronary stenting on 9/6/2024, states that he is compliant with his medications but he does not weigh himself daily.  States that he just takes too long.  Wife states that he is sedentary.  Patient is having difficulty sleeping, laying flat.  He has to prop himself up on pillows.    Nursing Notes were all reviewed and agreed with or any disagreements were addressed in the HPI.    REVIEW OF SYSTEMS :      Review of Systems   Constitutional:  Negative for activity change, chills and fever.   Respiratory:  Negative for chest tightness and shortness of breath.    Cardiovascular:  Positive for leg swelling. Negative for chest pain.

## 2024-10-06 NOTE — CONSULTS
Visit us at [facility] or [phone]    NEPHROLOGY ATTENDING CONSULTATION NOTE    Patient: Ariel Garcia MRN: [id number]     YOB: 1955  Age: 68 y.o.  Sex: male    [address] Room[address]Bed: 3TN-3385/3385-01 Location: [facility]     Admitting Physician: FELICIA MARTINS    Primary Care Physician: Abraham Boothe DO          LOS: 0 days       DATE OF CONSULTATION:   October 6, 2024      SOURCE:   History obtained from patient, family(extended family was in the room) and EHR      REASON FOR CONSULTATION:   I was asked to see Mr Garcia in consultation by Dr Yoder for the evaluation and management of BETINA on CKD3a.      HISTORY OF PRESENT ILLNESS:   This is a 68 y.o. WM who presented on 10/5/24 with increased SOB, wt gain (20Lbs) and LE edema that has progressed in the past 2 weeks. He has CAD and required extensive PCI on 9/6/24 with placement of 5 stents. His BNP was elevated at 16,539. He admits to not following a low Na diet or FR. His SCr was noted to be elevated at 1.6 (baseline 1.1-1.3).       PAST MEDICAL HISTORY:   CAD recent PCI on 9/6/24.   Systolic HF (EF 25%)  HTN  Hyperlipidemia  A fib      SOCIAL HISTORY:   Denies any tobacco or ETOH use.      FAMILY HISTORY:   No h/o kidney disease in the family.      REVIEW OF SYSTEMS:   CONSTITUTIONAL: Fever (-), Chills (-), Weakness (-), Change in appetite (-), Wt changes (+; increased)  CARDIOVASCULAR: Chest pain (-), Palpitation (-), Edema (++)  RESPIRATORY: LOVETT (+), Orthopnea (+/-), Cough (-)  GASTROINTESTINAL: Nausea (-), Vomiting (-), Diarrhea (-), Constipation (-), Bloody stool (-)   GENITOURINARY: Dysuria (-), Hematuria (-), Foaming (-), Nocturia (+), Stones (-), Urgency (-), Incontinence (-)  MUSCULOSKELETAL: Arthralgia (+), Back pain (+)  SKIN: Rash (-)  NEUROLOGIC: LH (-), Dizziness (-), Headache (-)  ENDOCRINE: DM (-), Thyroid disease (-)  HEMATOLOGY: Anemia (-)      PHYSICAL EXAM:   CONSTITUTIONAL:             Vitals:    10/06/24 0554 10/06/24 0812 10/06/24 0941 10/06/24 1548   BP: 110/86  (!) 126/90 110/83   Pulse: 98  90 91   Resp: 24  18 20   Temp: 97.8 °F (36.6 °C)  98 °F (36.7 °C)    TempSrc: Axillary  Oral    SpO2: 95%  98% 97%   Weight:  80.9 kg (178 lb 6.4 oz)     Height:                 Intake/Output Summary (Last 24 hours) at 10/6/2024 1604  Last data filed at 10/6/2024 1258  Gross per 24 hour   Intake 600 ml   Output 975 ml   Net -375 ml           Body habitus: overweight.  GENERAL APPEARANCE: NAD  PSYCHIATRY: Orientation: Ox3. Mood: cooperative  EYES: Conjunctivae: normal. Pupils: reactive to light  NECK: Trachea is in midline. Thyroid: not enlarged  RESPIRATORY: Respiratory effort: normal. Auscultation: bibasilar crackles  CARDIOVASCULAR: Auscultation: RRR, no mRG. Carotids: no bruits. Edema: 2+ up to the knees bilaterally.. Pedal pulse: difficult to palpate  GASTROINTESTINAL: Soft, nontender, nondistended.   EXTREMITIES:  +discoloration of toes  SKIN: Warm and dry.       LABS:   Labs were reviewed. Pertinent findings are highlighted under assessment and plan.    RFP:   Recent Labs     10/06/24  0101      K 4.1      CO2 19*   BUN 36*   CREATININE 1.6*       Liver panel:  Recent Labs     10/06/24  0101   AST 26   ALT 32       Endocrine:   @EKJCKOHA19(VitD,PTH,TSH,aldosterone,renin activity,cortisol,metanephrine)@    CBC:   Recent Labs     10/06/24  0101   WBC 6.4   HGB 13.5   HCT 42.4   MCV 81.3          Iron Panel:   @LVYZPIDG99(FERA,FE)@    Serology: @[id number](ANAS,ANCA,C3,C4,RNP,AGBM,DNA,SSA,SSB,SPEP,UPEP,ESR,HEPT)@    Urine studies: @KQKAFJQE90(UAPR,UCOL,UPH,UNAR,UUNR,UCRR,UCLR,UKR,UUAR,UOSM,UEOS)@  @tjpdfnupxc75@      ASSESSMENT and PLAN:     1. BETINA on CKD3a (baseline SCr 1.1-1.3): BETINA is likely in the setting of CRS (ADHF). Cholesterol embolization (concerns with toe discoloration) is less likely.   He is fluid overloaded with 20Lbs of wt gain.    - Diurese ++. Change to

## 2024-10-06 NOTE — ED NOTES
Report called to karol HENRIQUEZ. All questions and concerns answered at this time. Pt awake, alert and oriented. Respirations even and unlabored on room air. IV intact.

## 2024-10-06 NOTE — ED PROVIDER NOTES
In addition to the advanced practice provider, I personally saw Ariel Garcia and performed a substantive portion of the visit including all aspects of the medical decision making.    Medical Decision Making    Pt with recent placement of 5 cardiac  stents RCA, LAD,  with Dr. Corral-6-24.  Here with shortness of breath and bilateral leg edema.  Shortness of breath worse worse at night with laying flat.  No history of blood clots.  Right leg is clearly more edematous than the left.    On exam pt is resting comfortably  Cardiac RRR, no murmur  Lungs clear bilaterally, no increased work of breathing  Bilateral leg edema is noted right greater than left without signs of cellulitis.    Patient arrives tachycardic with EKG confirming new onset atrial flutter.  Blood pressure is on the elevated side.  CT PE study was done with asymmetric lower extremity edema however negative for blood clot.  There are findings of pleural effusions bilaterally and groundglass opacities in the lower lobes suggesting pulmonary edema.  BNP also suggest pulmonary edema.  IV Lasix was ordered.  He is denying chest pain and just had 5 cardiac stents placed.  High-sensitivity troponin is elevated on arrival with negative delta and nonischemic EKG.  Low suspicion that this is ACS.  Will benefit from admission for diuresis, further workup of atrial flutter, anticoagulation and DVT study to rule out right lower extremity blood clot.  He is agreeable.  EKG  The Ekg interpreted by me in the absence of a cardiologist shows.  Atrial flutter with RVR, ventricular rate of 104.  Axis left.  QTc appropriate.  No specific ST or T wave abnormality.  Compared to prior 9-6-2024, a flutter has replaced sinus rhythm.      SEP-1  Is this patient to be included in the SEP-1 Core Measure due to severe sepsis or septic shock?   No     Exclusion criteria - the patient is NOT to be included for SEP-1 Core Measure due to:  Infection is not suspected    Screenings      [employer] Coma Scale  Eye Opening: Spontaneous  Best Verbal Response: Oriented  Best Motor Response: Obeys commands  Roc Coma Scale Score: 15        CT CHEST PULMONARY EMBOLISM W CONTRAST   Preliminary Result   1. No pulmonary embolism.   2. Moderate right and small left pleural effusion with interlobular septal   thickening and ground-glass opacities in the bilateral upper lobes. Findings   are most consistent with pulmonary edema.   3. Severe coronary artery atherosclerosis.   4. Abdominal ascites.         XR CHEST PORTABLE   Final Result   Bibasilar increased density consistent with pneumonia.           Labs Reviewed   CBC WITH AUTO DIFFERENTIAL - Abnormal; Notable for the following components:       Result Value    RDW 17.1 (*)     All other components within normal limits   COMPREHENSIVE METABOLIC PANEL - Abnormal; Notable for the following components:    CO2 19 (*)     Anion Gap 17 (*)     Glucose 155 (*)     BUN 36 (*)     Creatinine 1.6 (*)     Est, Glom Filt Rate 46 (*)     Total Bilirubin 1.1 (*)     All other components within normal limits   BRAIN NATRIURETIC PEPTIDE - Abnormal; Notable for the following components:    NT Pro-BNP 16,539 (*)     All other components within normal limits   TROPONIN - Abnormal; Notable for the following components:    Troponin, High Sensitivity 34 (*)     All other components within normal limits   TROPONIN - Abnormal; Notable for the following components:    Troponin, High Sensitivity 32 (*)     All other components within normal limits   CULTURE, BLOOD 1   CULTURE, BLOOD 2   LACTATE, SEPSIS   PROCALCITONIN   LACTATE, SEPSIS     Medications   morphine injection 4 mg (4 mg IntraVENous Not Given 10/6/24 0126)   iopamidol (ISOVUE-370) 76 % injection 75 mL (75 mLs IntraVENous Given 10/6/24 0150)   furosemide (LASIX) injection 60 mg (60 mg IntraVENous Given 10/6/24 8224)            Patient Referrals:  No follow-up provider specified.    Discharge Medications:  New

## 2024-10-06 NOTE — PROGRESS NOTES
Updated wife and sister.  Went into room and repositioned pillows and elevated HOB per pt request.  VS taken.. Denies needs at this time.

## 2024-10-06 NOTE — ED NOTES
ED TO INPATIENT SBAR HANDOFF    Patient Name: Ariel Garcia   :  1955  68 y.o.   MRN:  [id number]  Preferred Name  Ariel  ED Room #:  ED-0010/10  Family/Caregiver Present yes   Restraints no   Sitter no   Sepsis Risk Score      Situation  Code Status: Prior No additional code details.    Allergies: Penicillins  Weight: Patient Vitals for the past 96 hrs (Last 3 readings):   Weight   10/05/24 2327 81.6 kg (180 lb)     Arrived from: home  Chief Complaint:   Chief Complaint   Patient presents with    Leg Swelling     Pt to ED via [facility] from home with c/o lower extremity edema and shortness of breath that started about a week ago. Pt states he had 5 stents placed 2 weeks ago.      Hospital Problem/Diagnosis:  Active Problems:    * No active hospital problems. *  Resolved Problems:    * No resolved hospital problems. *    Imaging:   CT CHEST PULMONARY EMBOLISM W CONTRAST   Preliminary Result   1. No pulmonary embolism.   2. Moderate right and small left pleural effusion with interlobular septal   thickening and ground-glass opacities in the bilateral upper lobes. Findings   are most consistent with pulmonary edema.   3. Severe coronary artery atherosclerosis.   4. Abdominal ascites.         XR CHEST PORTABLE   Final Result   Bibasilar increased density consistent with pneumonia.           Abnormal labs:   Abnormal Labs Reviewed   CBC WITH AUTO DIFFERENTIAL - Abnormal; Notable for the following components:       Result Value    RDW 17.1 (*)     All other components within normal limits   COMPREHENSIVE METABOLIC PANEL - Abnormal; Notable for the following components:    CO2 19 (*)     Anion Gap 17 (*)     Glucose 155 (*)     BUN 36 (*)     Creatinine 1.6 (*)     Est, Glom Filt Rate 46 (*)     Total Bilirubin 1.1 (*)     All other components within normal limits   BRAIN NATRIURETIC PEPTIDE - Abnormal; Notable for the following components:    NT Pro-BNP 16,539 (*)     All other components within normal  review the ED PT Care Timeline found under the Summary Nursing Index tab.    Recommendation    Pending orders Inpatient orders.  Plan for Discharge (if known):   Additional Comments: Swallows pills whole. Able to use call light. Ambulates independently.    If any further questions, please call Sending RN at [phone]    Electronically signed by: Electronically signed by Yesenia Goodwin RN on 10/6/2024 at 3:41 AM

## 2024-10-06 NOTE — H&P
Hospital Medicine History & Physical      Date of Admission: 10/5/2024    Date of Service:  Pt seen/examined on 10/6/2024     [x]Admitted to Inpatient with expected LOS greater than two midnights due to medical therapy.  []Placed in Observation status.    Chief Admission Complaint:  CHF Systolic exacerbation    Presenting Admission History:      68 y.o. male who presented to [facility] for dyspnea and bilateral lower extremity edema that has been going on for the last couple of weeks.  PMHx significant for CAD status post stent x 5.  On 9/6/2024, hypertension, chronic systolic congestive heart failure, Hamm's esophagus.  Patient lives with his wife at home and was recently in the hospital where he was found to have CAD required 5 stent placement by cardiology Dr. Corral on 9/6/2024, patient was placed on aspirin and Plavix, he is also known to have congestive heart failure systolic with ejection fraction of 25%.  Patient has not been watching his low-salt diet at home well he did initially after he had his heart cath but then slowly went back to her old habit of salty food, his weight has been increasing and he started to have increased edema in lower extremities right more than left in addition to some abdomen distention and dyspnea.  He presented to the emergency room with above symptoms, he denied having any fever, chills, chest pain, no nausea no vomiting.  He said he is active around the house but also spends a good amount of time sitting with his legs down.  Both wife and other family member were at the bedside and they confirmed the lack of salt intake compliance with the patient.    Vital sign was significant for tachycardia with heart rate of 124 then it started to come down slowly.  He was doing well on room air at 96%, blood work showed creatinine of 1.6 with a baseline of 1, BNP is 16,539, troponin stayed flat 34-32.  CBC within normal limit.  CT scan chest was negative for pulmonary

## 2024-10-06 NOTE — PROGRESS NOTES
Patient is admitted to room 3385 from the emergency room. Patient has arrived to the floor at this time via stretcher and ambulated/transfered to bed. Oriented to room. Call light and bedside table within reach. Patient rates a low fall risk. Denies further needs at this time. Will continue to monitor. Moise Mittal

## 2024-10-06 NOTE — CONSULTS
[facility]   Cardiology Consult   Date: 10/6/2024  Reason for Consultation: Heart failure  Consult Requesting Physician: Navya Yoder MD       Chief Complaint   Patient presents with    Leg Swelling     Pt to ED via [facility] from home with c/o lower extremity edema and shortness of breath that started about a week ago. Pt states he had 5 stents placed 2 weeks ago.        CC: Lower extremity swelling  HPI: Ariel Garcia is a 68 y.o. male who has presented to the hospital complaining of shortness of breath and lower extremity swelling.  He has noticed increasing shortness of breath and edema for the past 2 weeks.    Patient has history of multivessel coronary artery disease of CAD s/p PCI to LAD, RCA, and D1 on 9/6/2024, severe LV dysfunction and ischemic cardiomyopathy with ejection fraction of 25%, HTN, HLD, and tobacco use.    Patient's wife and daughter at bedside.  Reportedly patient had some palpitation/fluttering a few days ago.  Reports no syncope.  Patient reports increasing lower extremity swelling.    Assessment:   Acute on chronic HFrEF  Atrial flutter, new diagnosis  Ischemic cardiomyopathy with severe LV dysfunction  Multivessel CAD s/p PCI to LAD, RCA and D1  Pleural effusion   Ascites   BETINA   HTN  Tobacco use    Plan:   IV diuretics with lasix.   Close monitoring of volume status, electrolytes(K, NA), renal function     High risk for renal failure due to receiving contrast. Cr is 1.6 increased from 1.3.   Close monitoring of creatinine.   Strict I/Os  Weight dialy.   Salt and fluid restriction    Coreg   Not on ACE/ARB due to BETINA/CKD  Jardiance  10 mg/day  Pro-BNP serial     High risk for morbidity and mortality, rehospitalization due to multiple comorbidities.     Patient is in atrial flutter which is new diagnosis.  High risk [id number] score. Anticoagulation is recommended.    He is on DAPT and triple therapy is not recommended due to high risk of bleeding.

## 2024-10-07 ENCOUNTER — APPOINTMENT (OUTPATIENT)
Dept: VASCULAR LAB | Age: 69
End: 2024-10-07
Attending: INTERNAL MEDICINE
Payer: MEDICARE

## 2024-10-07 ENCOUNTER — APPOINTMENT (OUTPATIENT)
Dept: VASCULAR LAB | Age: 69
End: 2024-10-07
Payer: MEDICARE

## 2024-10-07 PROBLEM — R60.0 LEG EDEMA, RIGHT: Status: ACTIVE | Noted: 2024-10-07

## 2024-10-07 PROBLEM — I50.20 HFREF (HEART FAILURE WITH REDUCED EJECTION FRACTION) (HCC): Status: ACTIVE | Noted: 2024-10-07

## 2024-10-07 PROBLEM — I48.92 NEW ONSET ATRIAL FLUTTER (HCC): Status: ACTIVE | Noted: 2024-10-07

## 2024-10-07 LAB
ANION GAP SERPL CALCULATED.3IONS-SCNC: 14 MMOL/L (ref 3–16)
BASOPHILS # BLD: 0 K/UL (ref 0–0.2)
BASOPHILS NFR BLD: 0.8 %
BUN SERPL-MCNC: 34 MG/DL (ref 7–20)
C3 SERPL-MCNC: 119 MG/DL (ref 90–180)
C4 SERPL-MCNC: 23.1 MG/DL (ref 10–40)
CALCIUM SERPL-MCNC: 8.5 MG/DL (ref 8.3–10.6)
CHLORIDE SERPL-SCNC: 103 MMOL/L (ref 99–110)
CO2 SERPL-SCNC: 24 MMOL/L (ref 21–32)
CREAT SERPL-MCNC: 1.4 MG/DL (ref 0.8–1.3)
DEPRECATED RDW RBC AUTO: 16.7 % (ref 12.4–15.4)
ECHO BSA: 2.02 M2
ECHO BSA: 2.02 M2
EOSINOPHIL # BLD: 0.3 K/UL (ref 0–0.6)
EOSINOPHIL NFR BLD: 4.6 %
ERYTHROCYTE [SEDIMENTATION RATE] IN BLOOD BY WESTERGREN METHOD: 9 MM/HR (ref 0–20)
FERRITIN SERPL IA-MCNC: 257 NG/ML (ref 30–400)
GFR SERPLBLD CREATININE-BSD FMLA CKD-EPI: 54 ML/MIN/{1.73_M2}
GLUCOSE SERPL-MCNC: 109 MG/DL (ref 70–99)
HCT VFR BLD AUTO: 39.8 % (ref 40.5–52.5)
HGB BLD-MCNC: 13.2 G/DL (ref 13.5–17.5)
IRON SATN MFR SERPL: 12 % (ref 20–50)
IRON SERPL-MCNC: 40 UG/DL (ref 59–158)
LYMPHOCYTES # BLD: 1.4 K/UL (ref 1–5.1)
LYMPHOCYTES NFR BLD: 22.7 %
MAGNESIUM SERPL-MCNC: 2.2 MG/DL (ref 1.8–2.4)
MCH RBC QN AUTO: 26.4 PG (ref 26–34)
MCHC RBC AUTO-ENTMCNC: 33 G/DL (ref 31–36)
MCV RBC AUTO: 79.9 FL (ref 80–100)
MONOCYTES # BLD: 0.7 K/UL (ref 0–1.3)
MONOCYTES NFR BLD: 10.9 %
NEUTROPHILS # BLD: 3.7 K/UL (ref 1.7–7.7)
NEUTROPHILS NFR BLD: 61 %
PLATELET # BLD AUTO: 166 K/UL (ref 135–450)
PMV BLD AUTO: 8 FL (ref 5–10.5)
POTASSIUM SERPL-SCNC: 3.3 MMOL/L (ref 3.5–5.1)
RBC # BLD AUTO: 4.98 M/UL (ref 4.2–5.9)
SODIUM SERPL-SCNC: 141 MMOL/L (ref 136–145)
TIBC SERPL-MCNC: 330 UG/DL (ref 260–445)
VAS LEFT CCA DIST EDV: 15.7 CM/S
VAS LEFT CCA DIST PSV: 72.9 CM/S
VAS LEFT CCA MID EDV: 18.4 CM/S
VAS LEFT CCA MID PSV: 49 CM/S
VAS LEFT CCA PROX EDV: 22.3 CM/S
VAS LEFT CCA PROX PSV: 57.2 CM/S
VAS LEFT ECA PSV: 43.5 CM/S
VAS LEFT ICA DIST EDV: 17.2 CM/S
VAS LEFT ICA DIST PSV: 33.5 CM/S
VAS LEFT ICA MID EDV: 18 CM/S
VAS LEFT ICA MID PSV: 33.5 CM/S
VAS LEFT ICA PROX EDV: 20.5 CM/S
VAS LEFT ICA PROX PSV: 33.8 CM/S
VAS LEFT ICA/CCA PSV: 0.46
VAS LEFT SUBCLAVIAN PROX PSV: 98.8 CM/S
VAS LEFT VERTEBRAL EDV: 15.2 CM/S
VAS LEFT VERTEBRAL PSV: 42.5 CM/S
VAS RIGHT CCA DIST EDV: 13.6 CM/S
VAS RIGHT CCA DIST PSV: 47 CM/S
VAS RIGHT CCA MID EDV: 13.6 CM/S
VAS RIGHT CCA MID PSV: 56.6 CM/S
VAS RIGHT CCA PROX EDV: 13.6 CM/S
VAS RIGHT CCA PROX PSV: 55.3 CM/S
VAS RIGHT ECA PSV: 49.6 CM/S
VAS RIGHT ICA DIST EDV: 20.7 CM/S
VAS RIGHT ICA DIST PSV: 34.3 CM/S
VAS RIGHT ICA MID EDV: 31.2 CM/S
VAS RIGHT ICA MID PSV: 54.6 CM/S
VAS RIGHT ICA PROX EDV: 40.6 CM/S
VAS RIGHT ICA PROX PSV: 89 CM/S
VAS RIGHT ICA/CCA PSV: 1.89
VAS RIGHT SUBCLAVIAN PROX PSV: 56.2 CM/S
VAS RIGHT VERTEBRAL EDV: 6.54 CM/S
VAS RIGHT VERTEBRAL PSV: 25.7 CM/S
WBC # BLD AUTO: 6 K/UL (ref 4–11)

## 2024-10-07 PROCEDURE — 83540 ASSAY OF IRON: CPT

## 2024-10-07 PROCEDURE — 80048 BASIC METABOLIC PNL TOTAL CA: CPT

## 2024-10-07 PROCEDURE — 93971 EXTREMITY STUDY: CPT | Performed by: INTERNAL MEDICINE

## 2024-10-07 PROCEDURE — 85025 COMPLETE CBC W/AUTO DIFF WBC: CPT

## 2024-10-07 PROCEDURE — 82728 ASSAY OF FERRITIN: CPT

## 2024-10-07 PROCEDURE — 85652 RBC SED RATE AUTOMATED: CPT

## 2024-10-07 PROCEDURE — 2580000003 HC RX 258: Performed by: INTERNAL MEDICINE

## 2024-10-07 PROCEDURE — 83735 ASSAY OF MAGNESIUM: CPT

## 2024-10-07 PROCEDURE — 6370000000 HC RX 637 (ALT 250 FOR IP): Performed by: INTERNAL MEDICINE

## 2024-10-07 PROCEDURE — 36415 COLL VENOUS BLD VENIPUNCTURE: CPT

## 2024-10-07 PROCEDURE — 93880 EXTRACRANIAL BILAT STUDY: CPT

## 2024-10-07 PROCEDURE — 93971 EXTREMITY STUDY: CPT

## 2024-10-07 PROCEDURE — 93880 EXTRACRANIAL BILAT STUDY: CPT | Performed by: INTERNAL MEDICINE

## 2024-10-07 PROCEDURE — 2060000000 HC ICU INTERMEDIATE R&B

## 2024-10-07 PROCEDURE — 83550 IRON BINDING TEST: CPT

## 2024-10-07 PROCEDURE — 99232 SBSQ HOSP IP/OBS MODERATE 35: CPT | Performed by: NURSE PRACTITIONER

## 2024-10-07 PROCEDURE — 6370000000 HC RX 637 (ALT 250 FOR IP): Performed by: NURSE PRACTITIONER

## 2024-10-07 PROCEDURE — 99232 SBSQ HOSP IP/OBS MODERATE 35: CPT

## 2024-10-07 PROCEDURE — 6360000002 HC RX W HCPCS: Performed by: INTERNAL MEDICINE

## 2024-10-07 PROCEDURE — 86160 COMPLEMENT ANTIGEN: CPT

## 2024-10-07 RX ORDER — SPIRONOLACTONE 25 MG/1
12.5 TABLET ORAL EVERY OTHER DAY
Status: DISCONTINUED | OUTPATIENT
Start: 2024-10-07 | End: 2024-10-11 | Stop reason: HOSPADM

## 2024-10-07 RX ADMIN — CLOPIDOGREL BISULFATE 75 MG: 75 TABLET ORAL at 09:08

## 2024-10-07 RX ADMIN — FUROSEMIDE 10 MG/HR: 10 INJECTION, SOLUTION INTRAMUSCULAR; INTRAVENOUS at 01:04

## 2024-10-07 RX ADMIN — FUROSEMIDE 10 MG/HR: 10 INJECTION, SOLUTION INTRAMUSCULAR; INTRAVENOUS at 23:24

## 2024-10-07 RX ADMIN — CARVEDILOL 3.12 MG: 3.12 TABLET, FILM COATED ORAL at 09:08

## 2024-10-07 RX ADMIN — ATORVASTATIN CALCIUM 80 MG: 80 TABLET, FILM COATED ORAL at 20:29

## 2024-10-07 RX ADMIN — CARVEDILOL 3.12 MG: 3.12 TABLET, FILM COATED ORAL at 17:57

## 2024-10-07 RX ADMIN — SPIRONOLACTONE 12.5 MG: 25 TABLET ORAL at 12:32

## 2024-10-07 RX ADMIN — ASPIRIN 81 MG: 81 TABLET, COATED ORAL at 09:08

## 2024-10-07 RX ADMIN — POTASSIUM CHLORIDE 40 MEQ: 1500 TABLET, EXTENDED RELEASE ORAL at 05:33

## 2024-10-07 RX ADMIN — SODIUM CHLORIDE, PRESERVATIVE FREE 10 ML: 5 INJECTION INTRAVENOUS at 09:08

## 2024-10-07 RX ADMIN — PANTOPRAZOLE SODIUM 40 MG: 40 TABLET, DELAYED RELEASE ORAL at 05:33

## 2024-10-07 RX ADMIN — FUROSEMIDE 10 MG/HR: 10 INJECTION, SOLUTION INTRAMUSCULAR; INTRAVENOUS at 12:32

## 2024-10-07 ASSESSMENT — PAIN SCALES - GENERAL
PAINLEVEL_OUTOF10: 0
PAINLEVEL_OUTOF10: 0

## 2024-10-07 NOTE — PLAN OF CARE
Problem: Chronic Conditions and Co-morbidities  Goal: Patient's chronic conditions and co-morbidity symptoms are monitored and maintained or improved  10/6/2024 2309 by Rafia Parra RN  Outcome: Progressing  10/6/2024 1016 by Felisha Clemente RN  Outcome: Progressing     Problem: Discharge Planning  Goal: Discharge to home or other facility with appropriate resources  10/6/2024 2309 by Rafia Parra RN  Outcome: Progressing  10/6/2024 1016 by Felisha Clemente RN  Outcome: Progressing     Problem: Pain  Goal: Verbalizes/displays adequate comfort level or baseline comfort level  10/6/2024 2309 by Rafia Parra RN  Outcome: Progressing  10/6/2024 1016 by Felisha Clemente RN  Outcome: Progressing

## 2024-10-07 NOTE — PROGRESS NOTES
[facility] Daily Progress Note      Admit Date:  10/5/2024    Chief Complaint:  SOB, BLE edema    Subjective:  Mr. Garcia feels better today. Still significant swelling. No new complaints or concerns. Discussed NOAC + Plavix. Understands increased bleeding risk but lack of CVA protection without NOAC. Agrees to this.     Objective:   /77   Pulse 90   Temp 97.6 °F (36.4 °C) (Oral)   Resp 20   Ht 1.803 m (5' 11\")   Wt 86.6 kg (191 lb)   SpO2 96%   BMI 26.64 kg/m²     Intake/Output Summary (Last 24 hours) at 10/7/2024 1313  Last data filed at 10/7/2024 0536  Gross per 24 hour   Intake 713.97 ml   Output 2775 ml   Net -2061.03 ml       Physical Exam:  General:  Awake, alert, NAD  Skin:  Warm and dry  Neck:  JVD not visualized upright  Chest:  CTAB, Comfortable   Cardiovascular:  Irregular S1S2, no S3, No murmur  Abdomen:  Soft, ND, NT, No HSM  Extremities:  2+ BLE edema to knee    Medications:    spironolactone  12.5 mg Oral Every Other Day    aspirin  81 mg Oral Daily    atorvastatin  80 mg Oral Nightly    carvedilol  3.125 mg Oral BID WC    clopidogrel  75 mg Oral Daily    pantoprazole  40 mg Oral QAM AC    sodium chloride flush  5-40 mL IntraVENous 2 times per day    enoxaparin  40 mg SubCUTAneous Daily      sodium chloride      furosemide (LASIX) 100 mg in sodium chloride 0.9 % 100 mL infusion 10 mg/hr (10/07/24 1232)     sodium chloride flush, sodium chloride, potassium chloride **OR** potassium alternative oral replacement **OR** potassium chloride, magnesium sulfate, ondansetron **OR** ondansetron, polyethylene glycol, acetaminophen **OR** acetaminophen    TELEMETRY (Personally reviewed by me): Atrial fibrillation and Atrial flutter     Lab Data:  CBC:   Recent Labs     10/06/24  0101 10/07/24  0427   WBC 6.4 6.0   HGB 13.5 13.2*   HCT 42.4 39.8*   MCV 81.3 79.9*    166     BMP:   Recent Labs     10/06/24  0101 10/07/24  0427    141   K 4.1 3.3*    103   CO2 19* 24

## 2024-10-07 NOTE — PROGRESS NOTES
[facility]  HEART FAILURE  Progress Note      Admit Date 10/5/2024     Reason for Consult:      Reason for Consultation/Chief Complaint: leg swelling    HPI:    Ariel Garcia is a 68 y.o. male with PMH CAD, PCI, ICM, HFrEF, HTN, HLD and tobacco use admitted with SOB and edema, new onset Afl.       Subjective:  Patient is being seen for CHF. There were no acute overnight cardiac events.   Today Mr. Garcia is a little hard to keep on topic but his SOB and edema and improving and he denies chest pain, palpitations, or dizziness  I spent 10 minutes educating the patient on heart failure, medications, lifestyle modifications and dietary guidelines.       Baseline Weight: 180 per pt   Wt Readings from Last 3 Encounters:   10/07/24 86.6 kg (191 lb)   09/18/24 84.5 kg (186 lb 3.2 oz)   09/06/24 88.9 kg (196 lb)         Cardiac Testing:   Cath: 8/21/2024  Left Anterior Descending   Collaterals   Dist LAD filled by collaterals from 1st Sept.       Mid LAD lesion, 80% stenosed. The lesion is segmental.   Dist LAD lesion, 100% stenosed. Diagnostic coronary angiography shows positive for .      First Diagonal Branch   1st Diag lesion, 95% stenosed. The lesion is segmental.      Second Diagonal Branch   2nd Diag lesion, 95% stenosed.      Left Circumflex   Mid Cx to Dist Cx lesion, 90% stenosed. The lesion is segmental.      Right Coronary Artery   Mid RCA lesion, 80% stenosed.      Right Posterior Descending Artery   RPDA lesion, 99% stenosed.         Echo: 8/20/2024  Left Ventricle Severely reduced left ventricular systolic function. EF by visual approximation is 25%. EF by 2D Simpsons Biplane is 26%. Left ventricle size is normal. Mildly increased wall thickness. Severe global hypokinesis present. Grade II diastolic dysfunction with increased LAP. Average E/e' ratio is 11.0.   Left Atrium Left atrium is severely dilated.   Right Ventricle Right ventricle size is normal. Reduced systolic function. TAPSE is 1.3 cm.

## 2024-10-07 NOTE — CONSULTS
[facility]  HEART FAILURE PROGRAM      Ariel Garcia 1955    History:  Past Medical History:   Diagnosis Date    JERO (Hamm's esophagus)     Ganglion cyst     Heart murmur     Hypertension     Scoliosis        ECHO:  8/20/24    Left Ventricle: Severely reduced left ventricular systolic function. EF by visual approximation is 25%. EF by 2D Simpsons Biplane is 26%. Left ventricle size is normal. Mildly increased wall thickness. Severe global hypokinesis present. Grade II diastolic dysfunction with increased LAP. Average E/e' ratio is 11.0.    Right Ventricle: Reduced systolic function. TAPSE is 1.3 cm. RV Peak S' is 8 cm/s.    Mitral Valve: Mild annular calcification. Mildly thickened leaflets. Mild regurgitation.    Tricuspid Valve: The estimated RVSP is 30 mmHg.    Left Atrium: Left atrium is severely dilated.    Right Atrium: Right atrium is mildly dilated.    Aorta: Normal sized ascending aorta. Dilated aortic root. Ao root diameter is 3.6 cm.    Image quality is adequate. Contrast used: Definity. Technically difficult study with poor endocardial visualization.    ACE/ARB/ARNi: not on with tono and hypotension  BB: coreg 3.125 mg bid  Aldosterone Antagonist: aldactone 12.5 mg every other day  SGLT2: on hold-- stopped for diuresis-- restart before d/c per HF NP    History of sleep apnea: No    [place] Screen ordered: Yes    DM History: No      Last Hospital Admission: 8/19-8/22 with CHF  Code Status: full   Discharge plans: from home    Family Present: yes, wife and daughter    Ariel Garcia was admitted to the hospital with increased shortness of breath and edema. Patient was seen briefly for education as he was being discharged during his last admission. He was checking his weight daily but stopped monitoring everything because he was getting tired of it all. Discussed establishing new baseline weight. He was not following any sodium or fluid restriction at  home. Patient states compliance with  his medications and follow up visits.    Patient provided with both written and verbal education on CHF signs/ symptoms, causes, discharge medications, daily weights, low sodium diet, activity, and follow-up.  Pt to call if gains 3 pounds in one day or 5 pounds in one week. Mutually agreed upon goals were discussed such as calling the MD as soon as they recognize symptoms and weight gain, maintaining proper diet, taking medications as prescribed, joining cardiac rehab when able. Also reviewed importance of risk factor reduction. Patient provided with CHF Zone Management tool and CHF symptoms magnet.    Discussed importance of lifestyle changes: encouraged sodium and fluid restriction    PATIENT/CAREGIVER TEACHING:    Level of patient/caregiver understanding able to:   [ x] Verbalize understanding [ ] Demonstrate understanding [ ] Teach back   [ ] Needs reinforcement [ ] Other:       Time spent teachin mins    1. WEIGHT: Admit Weight - Scale: 81.6 kg (180 lb)      Today  Weight - Scale: 86.6 kg (191 lb)   2. I/O   Intake/Output Summary (Last 24 hours) at 10/7/2024 1257  Last data filed at 10/7/2024 0536  Gross per 24 hour   Intake 713.97 ml   Output 3175 ml   Net -2461.03 ml       Recommendations:   1. Patient will need a one week or less hospital follow up scheduled before discharge.   2. Educate further on fluid restriction 48 oz- 64 oz during inpatient stay so they can understand how to measure intake at home.   3. Continue to educate on S/S.   4. Emphasize daily weights, diet, and knowing when and who to call  5. Provided patient with CHF Resource Line for questions and concerns.  6. Patient would benefit from cardiac rehab as an outpatient. Flyer provided.       ZORAIDA VALENTIN RN 10/7/2024 12:57 PM

## 2024-10-07 NOTE — PROGRESS NOTES
is 8 cm/s.    Mitral Valve: Mild annular calcification. Mildly thickened leaflets. Mild regurgitation.    Tricuspid Valve: The estimated RVSP is 30 mmHg.    Left Atrium: Left atrium is severely dilated.    Right Atrium: Right atrium is mildly dilated.    Aorta: Normal sized ascending aorta. Dilated aortic root. Ao root diameter is 3.6 cm.    Image quality is adequate. Contrast used: Definity. Technically difficult study with poor endocardial visualization.    Scheduled Meds:   aspirin  81 mg Oral Daily    atorvastatin  80 mg Oral Nightly    carvedilol  3.125 mg Oral BID WC    clopidogrel  75 mg Oral Daily    pantoprazole  40 mg Oral QAM AC    sodium chloride flush  5-40 mL IntraVENous 2 times per day    enoxaparin  40 mg SubCUTAneous Daily     Continuous Infusions:   sodium chloride      furosemide (LASIX) 100 mg in sodium chloride 0.9 % 100 mL infusion 10 mg/hr (10/07/24 0104)     PRN Meds:.sodium chloride flush, sodium chloride, potassium chloride **OR** potassium alternative oral replacement **OR** potassium chloride, magnesium sulfate, ondansetron **OR** ondansetron, polyethylene glycol, acetaminophen **OR** acetaminophen     Prior to Admission medications    Medication Sig Start Date End Date Taking? Authorizing Provider   clopidogrel (PLAVIX) 75 MG tablet Take 1 tablet by mouth daily 9/6/24  Yes Melvin Corral MD   furosemide (LASIX) 40 MG tablet Take 1 tablet by mouth 2 times daily 8/29/24  Yes Michael Fu MD   carvedilol (COREG) 3.125 MG tablet Take 1 tablet by mouth 2 times daily (with meals) 8/22/24  Yes Gamaliel Edwards MD   atorvastatin (LIPITOR) 80 MG tablet Take 1 tablet by mouth nightly 8/22/24  Yes Gamaliel Edwards MD   aspirin 81 MG EC tablet Take 1 tablet by mouth daily 8/22/24  Yes Gamaliel Edwards MD   empagliflozin (JARDIANCE) 10 MG tablet Take 1 tablet by mouth daily 8/22/24  Yes Gamaliel Edwards MD   esomeprazole (NEXIUM) 40 MG capsule Take 1 capsule by mouth every morning (before breakfast)    Yes Provider, MD Flaco     Physical Examination:  Vitals:    10/07/24 0340   BP: 113/81   Pulse: 78   Resp: 20   Temp: 97.5 °F (36.4 °C)   SpO2: 98%      In: 1074 [P.O.:960; I.V.:114]  Out: 3750    Wt Readings from Last 3 Encounters:   10/07/24 86.6 kg (191 lb)   24 84.5 kg (186 lb 3.2 oz)   24 88.9 kg (196 lb)     Temp  Av.7 °F (36.5 °C)  Min: 97.5 °F (36.4 °C)  Max: 98 °F (36.7 °C)  Pulse  Av.8  Min: 78  Max: 93  BP  Min: 110/83  Max: 126/90  SpO2  Av %  Min: 95 %  Max: 98 %    Intake/Output Summary (Last 24 hours) at 10/7/2024 0825  Last data filed at 10/7/2024 0536  Gross per 24 hour   Intake 1073.97 ml   Output 3750 ml   Net -2676.03 ml     Constitutional: Oriented. No distress.   Cardiovascular: Normal rate,irregular  regular rhythm, S1&S2.    Pulmonary/Chest: Bilateral respiratory sounds. No rhonchi.    Abdominal: Soft. No tenderness   Musculoskeletal:+3 BL LE pitting edema   Neurological: Alert and oriented. Follows command    Active Hospital Problems    Diagnosis Date Noted    Acute on chronic systolic CHF (congestive heart failure) (HCC) [I50.23] 10/06/2024     Past Medical History:   Diagnosis Date    JERO (Hamm's esophagus)     Ganglion cyst     Heart murmur     Hypertension     Scoliosis       Past Surgical History:   Procedure Laterality Date    CARDIAC PROCEDURE N/A 2024    Left heart cath / coronary angiography performed by Melvin Corral MD at [facility] CARDIAC CATH LAB    CARDIAC PROCEDURE N/A 2024    Percutaneous coronary intervention performed by Melvin Corral MD at [facility] CARDIAC CATH LAB    UPPER GASTROINTESTINAL ENDOSCOPY  2011    with biopsy     No Known Allergies     reports that he has been smoking cigarettes. He has a 10 pack-year smoking history. He does not have any smokeless tobacco history on file. He reports current alcohol use.     Yesenia Huff, APRN - CNP  [facility]  Office: [phone]

## 2024-10-07 NOTE — PROGRESS NOTES
Pt has been repositioned several times and after each time pt tucks left leg under her and starts to turn sideways favoring the left side of the bed regardless of pillows and wedge being used.

## 2024-10-07 NOTE — CARE COORDINATION
10/07/24 1129   IMM Letter   IMM Letter given to Patient/Family/Significant other/Guardian/POA/by: IMM given by CM   IMM Letter date given: 10/07/24   IMM Letter time given: 1124

## 2024-10-07 NOTE — PROGRESS NOTES
Nutrition Education    Provided heart failure diet education. Pt with recent new dx of HF during August admission. Education included low sodium diet guidelines (2-3 gm Na+/day) and fluid restriction (64 oz/day). Reviewed foods to choose and foods to avoid, along with label reading and ways to add flavor to food. Pt has stopped adding salt to food. Pt states understanding of education. Time spent with patient: 15 minutes.      Educated on 10/7  Learners: Patient and Family (wife and daughter)  Readiness: Acceptance  Method: Explanation and Handout  Response: Verbalizes Understanding  Contact name and number provided.    Wilma Rodrigues, MS, RD, LD  Contact Number: [phone]

## 2024-10-07 NOTE — CARE COORDINATION
10/07/24 1129   IMM Letter   IMM Letter given to Patient/Family/Significant other/Guardian/POA/by: IMM given by CM   IMM Letter date given: 10/07/24   IMM Letter time given: 1123

## 2024-10-07 NOTE — PROGRESS NOTES
Visit us at [employer] or call us at [address] [place] [address]    NEPHROLOGY ATTENDING CONSULTATION NOTE    Patient: Ariel Garcia MRN: [id number]     YOB: 1955  Age: 68 y.o.  Sex: male    [address] 3[address]-3385/3385-01 Location: [facility]     Admitting Physician: FELICIA MARTINS    Primary Care Physician: Abraham Boothe DO          LOS: 1 day       DATE OF CONSULTATION:   October 7, 2024      SOURCE:   History obtained from patient, family(extended family was in the room) and EHR      REASON FOR CONSULTATION:   I was asked to see Mr Garcia in consultation by Dr Yoder for the evaluation and management of BETINA on CKD3a.      HISTORY OF PRESENT ILLNESS:   This is a 68 y.o. WM who presented on 10/5/24 with increased SOB, wt gain (20Lbs) and LE edema that has progressed in the past 2 weeks. He has CAD and required extensive PCI on 9/6/24 with placement of 5 stents. His BNP was elevated at 16,539. He admits to not following a low Na diet or FR. His SCr was noted to be elevated at 1.6 (baseline 1.1-1.3).     Interval history  Diuresed well with lasix drip.   Family at bedside.       PHYSICAL EXAM:   CONSTITUTIONAL:            Vitals:    10/07/24 0002 10/07/24 0340 10/07/24 0401 10/07/24 0900   BP: 111/76 113/81  (!) 127/93   Pulse: 87 78  96   Resp: 20 20  20   Temp: 97.5 °F (36.4 °C) 97.5 °F (36.4 °C)  97.8 °F (36.6 °C)   TempSrc: Temporal Temporal  Temporal   SpO2: 95% 98%  96%   Weight:   86.6 kg (191 lb)    Height:                 Intake/Output Summary (Last 24 hours) at 10/7/2024 1217  Last data filed at 10/7/2024 0536  Gross per 24 hour   Intake 713.97 ml   Output 3175 ml   Net -2461.03 ml           Body habitus: overweight.  GENERAL APPEARANCE: NAD  PSYCHIATRY: Orientation: Ox3. Mood: cooperative  EYES: Conjunctivae: normal. Pupils: reactive to light  NECK: Trachea is in midline. Thyroid: not enlarged  RESPIRATORY: Respiratory effort: normal. Auscultation: bibasilar

## 2024-10-07 NOTE — PROGRESS NOTES
[facility]    10/7/2024 12:14 PM        Name: Ariel Garcia .              Admitted: 10/5/2024  Primary Care Provider: Abraham Boothe DO (Tel: [phone])      Chief complaint: 67 yo male with hx CHF (EF 25%). Presented with worsening shortness of breath and swelling. Admitted with CHF exacerbation.     Subjective:  Resting in bed. States he is feeling better. Urine output good on Lasix drip. Denies chest pain, palpitations, lightheadedness, abdominal pain, nausea. Swelling improved.     Reviewed interval ancillary notes    Current Medications  spironolactone (ALDACTONE) tablet 12.5 mg, Every Other Day  aspirin EC tablet 81 mg, Daily  atorvastatin (LIPITOR) tablet 80 mg, Nightly  carvedilol (COREG) tablet 3.125 mg, BID WC  clopidogrel (PLAVIX) tablet 75 mg, Daily  pantoprazole (PROTONIX) tablet 40 mg, QAM AC  sodium chloride flush 0.9 % injection 5-40 mL, 2 times per day  sodium chloride flush 0.9 % injection 5-40 mL, PRN  0.9 % sodium chloride infusion, PRN  potassium chloride (KLOR-CON M) extended release tablet 40 mEq, PRN   Or  potassium bicarb-citric acid (EFFER-K) effervescent tablet 40 mEq, PRN   Or  potassium chloride 10 mEq/100 mL IVPB (Peripheral Line), PRN  magnesium sulfate 2000 mg in 50 mL IVPB premix, PRN  enoxaparin (LOVENOX) injection 40 mg, Daily  ondansetron (ZOFRAN-ODT) disintegrating tablet 4 mg, Q8H PRN   Or  ondansetron (ZOFRAN) injection 4 mg, Q6H PRN  polyethylene glycol (GLYCOLAX) packet 17 g, Daily PRN  acetaminophen (TYLENOL) tablet 650 mg, Q6H PRN   Or  acetaminophen (TYLENOL) suppository 650 mg, Q6H PRN  furosemide (LASIX) 100 mg in sodium chloride 0.9 % 100 mL infusion, Continuous        Objective:  BP (!) 127/93   Pulse 96   Temp 97.8 °F (36.6 °C) (Temporal)   Resp 20   Ht 1.803 m (5' 11\")   Wt 86.6 kg (191 lb)   SpO2 96%   BMI 26.64 kg/m²     Intake/Output Summary (Last  ascites, negative for PE  - Has been noncompliant with low sodium diet  - Given IV Lasix 60 mg in ER, continued IV Lasix on admission  - Now on Lasix drip per nephrology, good output  - Continue carvedilol 3.125 mg  - Hold Jardiance given BETINA   - No ACE/ARB/ARNi secondary CKD and hypotension  - Started on spironolactone per cardiology  - Appreciate cardiology recs    CAD   - S/p angioplasty and stenting of RCA/LAD  and diagonal   9/16/2024  - Denies chest pain  - Continue DAPT, statin  - Interventional cardiology to see    BETINA on CKD  - Creatinine 1.6 on presentation, compared to 1.3 just last month  - Volume overloaded, pleural effusions and ascites noted on CT scan  - Possible CRS  - Nephrology evaluated, started on Lasix drip  - Avoid nephrotoxins  - Monitor closely given need for diuresis, IV contrast received in ER  - Creatinine trending down with diuresis  - BMP in am    New onset atrial flutter  - Presented in atrial flutter with   - Rate now controlled  - Continue carvedilol  - EP evaluated, high risk [id number] score and AC recommended, however, currently on DAPT and triple therapy is not recommended due to high risk of bleeding  - Interventional cardiology to see with regards to duration of DAPT and when AC can be added    Hypokalemia  - Potassium 3.3 today, most likely secondary to diuresis  - Magnesium 2.2  - Replaced, on replacement protocol  - Cardiology started on spironolactone  - BMP in am    Tobacco use  - Encouraged smoking cessation        Diet: ADULT DIET; Regular; Low Sodium (2 gm); 1500 ml  Code:Full Code  DVT PPX: enoxaparin      Angie Rouse, RAMONA - CNP   10/7/2024 12:14 PM

## 2024-10-08 LAB
ANION GAP SERPL CALCULATED.3IONS-SCNC: 15 MMOL/L (ref 3–16)
BUN SERPL-MCNC: 33 MG/DL (ref 7–20)
CALCIUM SERPL-MCNC: 8.6 MG/DL (ref 8.3–10.6)
CHLORIDE SERPL-SCNC: 104 MMOL/L (ref 99–110)
CO2 SERPL-SCNC: 23 MMOL/L (ref 21–32)
CREAT SERPL-MCNC: 1.5 MG/DL (ref 0.8–1.3)
GFR SERPLBLD CREATININE-BSD FMLA CKD-EPI: 50 ML/MIN/{1.73_M2}
GLUCOSE SERPL-MCNC: 113 MG/DL (ref 70–99)
MAGNESIUM SERPL-MCNC: 2.1 MG/DL (ref 1.8–2.4)
POTASSIUM SERPL-SCNC: 3.2 MMOL/L (ref 3.5–5.1)
SODIUM SERPL-SCNC: 142 MMOL/L (ref 136–145)
TSH SERPL DL<=0.005 MIU/L-ACNC: 7.82 UIU/ML (ref 0.27–4.2)

## 2024-10-08 PROCEDURE — 2580000003 HC RX 258: Performed by: INTERNAL MEDICINE

## 2024-10-08 PROCEDURE — 6360000002 HC RX W HCPCS: Performed by: INTERNAL MEDICINE

## 2024-10-08 PROCEDURE — 6370000000 HC RX 637 (ALT 250 FOR IP): Performed by: INTERNAL MEDICINE

## 2024-10-08 PROCEDURE — 6370000000 HC RX 637 (ALT 250 FOR IP): Performed by: HOSPITALIST

## 2024-10-08 PROCEDURE — 99232 SBSQ HOSP IP/OBS MODERATE 35: CPT

## 2024-10-08 PROCEDURE — 84443 ASSAY THYROID STIM HORMONE: CPT

## 2024-10-08 PROCEDURE — 6370000000 HC RX 637 (ALT 250 FOR IP)

## 2024-10-08 PROCEDURE — 80048 BASIC METABOLIC PNL TOTAL CA: CPT

## 2024-10-08 PROCEDURE — 99232 SBSQ HOSP IP/OBS MODERATE 35: CPT | Performed by: NURSE PRACTITIONER

## 2024-10-08 PROCEDURE — 2060000000 HC ICU INTERMEDIATE R&B

## 2024-10-08 PROCEDURE — 83735 ASSAY OF MAGNESIUM: CPT

## 2024-10-08 PROCEDURE — 36415 COLL VENOUS BLD VENIPUNCTURE: CPT

## 2024-10-08 RX ORDER — POTASSIUM CHLORIDE 1500 MG/1
40 TABLET, EXTENDED RELEASE ORAL 2 TIMES DAILY WITH MEALS
Status: COMPLETED | OUTPATIENT
Start: 2024-10-08 | End: 2024-10-08

## 2024-10-08 RX ADMIN — CLOPIDOGREL BISULFATE 75 MG: 75 TABLET ORAL at 08:27

## 2024-10-08 RX ADMIN — CARVEDILOL 3.12 MG: 3.12 TABLET, FILM COATED ORAL at 08:27

## 2024-10-08 RX ADMIN — ASPIRIN 81 MG: 81 TABLET, COATED ORAL at 08:27

## 2024-10-08 RX ADMIN — POTASSIUM CHLORIDE 40 MEQ: 1500 TABLET, EXTENDED RELEASE ORAL at 08:27

## 2024-10-08 RX ADMIN — SODIUM CHLORIDE, PRESERVATIVE FREE 10 ML: 5 INJECTION INTRAVENOUS at 08:28

## 2024-10-08 RX ADMIN — CARVEDILOL 3.12 MG: 3.12 TABLET, FILM COATED ORAL at 17:26

## 2024-10-08 RX ADMIN — POTASSIUM CHLORIDE 40 MEQ: 1500 TABLET, EXTENDED RELEASE ORAL at 17:26

## 2024-10-08 RX ADMIN — APIXABAN 5 MG: 5 TABLET, FILM COATED ORAL at 15:56

## 2024-10-08 RX ADMIN — EMPAGLIFLOZIN 10 MG: 10 TABLET, FILM COATED ORAL at 13:21

## 2024-10-08 RX ADMIN — FUROSEMIDE 10 MG/HR: 10 INJECTION, SOLUTION INTRAMUSCULAR; INTRAVENOUS at 19:57

## 2024-10-08 RX ADMIN — SODIUM CHLORIDE, PRESERVATIVE FREE 10 ML: 5 INJECTION INTRAVENOUS at 19:57

## 2024-10-08 RX ADMIN — FUROSEMIDE 10 MG/HR: 10 INJECTION, SOLUTION INTRAMUSCULAR; INTRAVENOUS at 09:47

## 2024-10-08 RX ADMIN — ATORVASTATIN CALCIUM 80 MG: 80 TABLET, FILM COATED ORAL at 20:45

## 2024-10-08 RX ADMIN — PANTOPRAZOLE SODIUM 40 MG: 40 TABLET, DELAYED RELEASE ORAL at 06:27

## 2024-10-08 ASSESSMENT — PAIN DESCRIPTION - LOCATION: LOCATION: LEG

## 2024-10-08 ASSESSMENT — PAIN DESCRIPTION - DESCRIPTORS: DESCRIPTORS: PINS AND NEEDLES

## 2024-10-08 ASSESSMENT — PAIN DESCRIPTION - ORIENTATION: ORIENTATION: RIGHT;LEFT

## 2024-10-08 ASSESSMENT — PAIN SCALES - GENERAL: PAINLEVEL_OUTOF10: 2

## 2024-10-08 NOTE — PROGRESS NOTES
[facility], [facility] [place]   Electrophysiology   Date: 10/8/2024  Reason for Follow up: New onset Atrial Flutter    Consult Requesting Physician: Woflgang Zavala MD     Chief Complaint   Patient presents with    Leg Swelling     Pt to ED via [facility] from home with c/o lower extremity edema and shortness of breath that started about a week ago. Pt states he had 5 stents placed 2 weeks ago.        CC: lower extremity swelling    HPI: Ariel Garcia is a 68 y.o. male with past medical history of HFrEF, ICM, hypertension, CAD s/p PCI, CKD, HTN and HLD.    Patient presented to hospital with complaints of shortness of breath and lower extremity swelling. He has noticed increasing shortness of breath and edema for the past 2 weeks. He was admitted for management of acute on chronic CHF. Found to be in atrial flutter upon presentation, rate in the 120's- new diagnosis     Patient being seen today for follow. Telemetry reviewed, he remains in atrial flutter, rate is well controlled. He denies palpitations, SOB or chest pain. Had long discussion with patient, wife and daughter yesterday regarding management/treatment of atrial flutter. Discussed that he is high risk for stroke and anticoagulation was recommended. He has had recent PCI, was on Asa and Plavix PTA. Discussed with IC, they are ok with stopped Asa, with the addition of Eliquis. Patient and family agreeable. Potentially the need for AICD if no improvement of EF after 3-4 months of GDMT was also discussed. Will follow up with patient in office. He will need limited echo in 3-4 months for evaluation of EF.    He denies chest pain, palpitations, chest pain, and/or orthopnea during my visit    Assessment and Plan:     New  onset atrial flutter  - Atrial flutter with controlled rate today on telemetry  - [id number] Score is 4 ( LV dysfunction, HTN, CAD, age > 65), he is high risk for embolic event, anticoagulation is recommended  -He has had recent  to Admission medications    Medication Sig Start Date End Date Taking? Authorizing Provider   clopidogrel (PLAVIX) 75 MG tablet Take 1 tablet by mouth daily 24  Yes Melvin Corral MD   furosemide (LASIX) 40 MG tablet Take 1 tablet by mouth 2 times daily 24  Yes Michael Fu MD   carvedilol (COREG) 3.125 MG tablet Take 1 tablet by mouth 2 times daily (with meals) 24  Yes Gamaliel Edwards MD   atorvastatin (LIPITOR) 80 MG tablet Take 1 tablet by mouth nightly 24  Yes Gamaliel Edwards MD   aspirin 81 MG EC tablet Take 1 tablet by mouth daily 24  Yes Gamaliel Edwards MD   empagliflozin (JARDIANCE) 10 MG tablet Take 1 tablet by mouth daily 24  Yes Gamaliel Edwards MD   esomeprazole (NEXIUM) 40 MG capsule Take 1 capsule by mouth every morning (before breakfast)   Yes ProviderFlaco MD     Physical Examination:  Vitals:    10/08/24 0715   BP: 107/69   Pulse: 79   Resp: 18   Temp: 97.5 °F (36.4 °C)   SpO2: 95%      In: 813.8 [P.O.:600; I.V.:213.8]  Out: 2950    Wt Readings from Last 3 Encounters:   10/08/24 85.3 kg (188 lb)   24 84.5 kg (186 lb 3.2 oz)   24 88.9 kg (196 lb)     Temp  Av.8 °F (36.6 °C)  Min: 97.2 °F (36.2 °C)  Max: 98.4 °F (36.9 °C)  Pulse  Av.7  Min: 78  Max: 99  BP  Min: 101/66  Max: 114/69  SpO2  Av.8 %  Min: 95 %  Max: 97 %    Intake/Output Summary (Last 24 hours) at 10/8/2024 0946  Last data filed at 10/8/2024 0408  Gross per 24 hour   Intake 813.78 ml   Output 2300 ml   Net -1486.22 ml     Constitutional: Oriented. No distress.   Cardiovascular: Normal rate,irregular  regular rhythm, S1&S2.    Pulmonary/Chest: Bilateral respiratory sounds. No rhonchi.    Abdominal: Soft. No tenderness   Musculoskeletal:+3 BL LE pitting edema   Neurological: Alert and oriented. Follows command    Active Hospital Problems    Diagnosis Date Noted    New onset atrial flutter (HCC) [I48.92] 10/07/2024    Leg edema, right [R60.0] 10/07/2024    HFrEF (heart

## 2024-10-08 NOTE — PROGRESS NOTES
The Cosmos Sleepiness Scale       The Cosmos Sleepiness Scale is widely used in the field of sleep medicine as a subjective measure of a patient's sleepiness. The test is a list of eight situations in which you rate your tendency to become sleepy on a scale of 0, no chance to 3, high chance of dozing. Your score is based on a scale of 0 to 24. The scale estimates whether you are experiencing excessive sleepiness that possibly requires medical attention.     How Sleepy Are You?  How sleepy are you to doze off or fall asleep in the following situations? You should rate your chances of dozing off, not just feeling tired. Even if you have not done some of these things recently try to determine how they would have affected you. For each situation, decide whether or not you would have:     0 = No chance of dozing 1 = Slight chance of dozing   2 = Moderate chance of  dozing 3 = High change of dozing       Situation                                                                                     Chance of Dozing    Sitting and reading  0 =  [x]  1 =    [] 2 =    [] 3 =    []    Watching TV  0 =  [x]  1 =    [] 2 =    [] 3 =    []      Sitting inactive in public place (e.g., a theater or a meeting)  0 =  [x]  1 =    [] 2 =    [] 3 =    []    As a passenger in a car for an hour without a break          0  =  []  1 =    [] 2 =    [] 3 =    []    Lying down to rest in the afternoon when circumstances permit    0 =  []  1 =    [x] 2 =    [] 3 =    []    Sitting and talking to someone  0 =  [x]  1 =    [] 2 =    [] 3 =    []      Sitting quietly after a lunch without alcohol  0 =  []  1 =    [x] 2 =    [] 3 =    []    In a car, while stopped for a few minutes in traffic                                                                      0 =  [x]  1 =    [] 2 =    [] 3 =    []    Total Score = 2    If your total score is 10 or greater, you are experiencing excessive sleepiness and should consider seeking a medical

## 2024-10-08 NOTE — PROGRESS NOTES
Hospitalist Progress Note      Name:  Ariel Garcia /Age/Sex: 1955  (68 y.o. male)   MRN & CSN:  [id number] & [id number] Encounter Date/Time: 10/8/2024 10:34 AM EDT   Location:  [facility]3385/3385-01 PCP: Abraham Boothe DO     Attending:Wolfgang Zavala MD       Hospital Day: 4    Subjective:   Chief Complaint:   Chief Complaint   Patient presents with    Leg Swelling     Pt to ED via [facility] from home with c/o lower extremity edema and shortness of breath that started about a week ago. Pt states he had 5 stents placed 2 weeks ago.      Ariel Garcia is a 68 y.o. male with a past medical history of hypertension, hyperlipidemia, CKD, CAD s/p DEXTER RCA, D1, LAD 2024, CMP EF 20% who presented with SOB and leg swelling.  Admitted for acute systolic CHF  Noted to have 20 lb weight gain. Treated with IV lasix.  Cardiology consulted.  Nephrology consulted for BETINA on CKD3a.     Recently admitted for SOB and NSTEMI s/p LHC showed MVD and he declined CABG or PCI at the time and was discharge with OP follow up 2024.  S/p OP LHC with intervention RCA/LAD and D1 as above .      Interval History:  Today, he is resting in bed.  Denies CP or SOB.  No swelling or abd pain.  Appears agitated, reports right neck pain, thinks its from his carotids, declines any medical therapy for musculoskeletal pain.   Denies new symptoms today, remains on lasix gtt, left leg is improving. Continues with swelling and abd bloating.     Independently reviewed interval ancillary notes from cardiology and nephrology.     Assessment and Recommendations   Problem List  Principal Problem:    Acute on chronic systolic CHF (congestive heart failure) (HCC)  Active Problems:    New onset atrial flutter (HCC)    Leg edema, right    HFrEF (heart failure with reduced ejection fraction) (HCC)  Resolved Problems:    * No resolved hospital problems. *     Assessment and Plan:    Acute on chronic sCHF  - Presented with worsening shortness  Results   Component Value Date/Time    LABA1C 7.3 10/01/2024 11:40 AM     TSH: No results found for: \"TSH\"  Troponin: No results found for: \"TROPONINT\"  Lactic Acid: No results for input(s): \"LACTA\" in the last 72 hours.  BNP:   Recent Labs     10/06/24  0101   PROBNP 16,539*     UA:No results found for: \"NITRU\", \"COLORU\", \"PHUR\", \"LABCAST\", \"WBCUA\", \"RBCUA\", \"MUCUS\", \"TRICHOMONAS\", \"YEAST\", \"BACTERIA\", \"CLARITYU\", \"SPECGRAV\", \"LEUKOCYTESUR\", \"UROBILINOGEN\", \"BILIRUBINUR\", \"BLOODU\", \"GLUCOSEU\", \"KETUA\", \"AMORPHOUS\"  Urine Cultures: No results found for: \"LABURIN\"  Blood Cultures:   Lab Results   Component Value Date/Time    BC  10/06/2024 12:42 AM     No Growth to date.  Any change in status will be called.     Lab Results   Component Value Date/Time    BLOODCULT2  10/06/2024 12:42 AM     No Growth to date.  Any change in status will be called.     Organism: No results found for: \"ORG\"    Personally reviewed labs, diagnostic, device, and imaging results reviewed as a part of this visit    Electronically signed by RAMONA Brewer CNP on 10/8/2024 at 10:34 AM

## 2024-10-08 NOTE — PROGRESS NOTES
Visit us at [email or web address] or call us at [phone] [place] [address]    NEPHROLOGY ATTENDING CONSULTATION NOTE    Patient: Ariel Garcia MRN: [id number]     YOB: 1955  Age: 68 y.o.  Sex: male    [address] Room[address] 3TN-3385/3385-01 Location: [facility]     Admitting Physician: FELICIA MARTINS    Primary Care Physician: Abraham Boothe DO          LOS: 2 days       DATE OF CONSULTATION:   October 8, 2024      SOURCE:   History obtained from patient, family(extended family was in the room) and EHR      REASON FOR CONSULTATION:   I was asked to see Mr Garcia in consultation by Dr Yoder for the evaluation and management of BETINA on CKD3a.      HISTORY OF PRESENT ILLNESS:   This is a 68 y.o. WM who presented on 10/5/24 with increased SOB, wt gain (20Lbs) and LE edema that has progressed in the past 2 weeks. He has CAD and required extensive PCI on 9/6/24 with placement of 5 stents. His BNP was elevated at 16,539. He admits to not following a low Na diet or FR. His SCr was noted to be elevated at 1.6 (baseline 1.1-1.3).     Interval history  Diuresing with lasix drip.   Weight coming down slowly      PHYSICAL EXAM:   CONSTITUTIONAL:            Vitals:    10/07/24 2315 10/08/24 0330 10/08/24 0600 10/08/24 0715   BP: 101/66 111/78  107/69   Pulse: 99 78  79   Resp: 20 20  18   Temp: 98.4 °F (36.9 °C) 97.8 °F (36.6 °C)  97.5 °F (36.4 °C)   TempSrc: Temporal Temporal  Temporal   SpO2: 95% 96%  95%   Weight:   85.3 kg (188 lb)    Height:                 Intake/Output Summary (Last 24 hours) at 10/8/2024 1139  Last data filed at 10/8/2024 0408  Gross per 24 hour   Intake 813.78 ml   Output 2300 ml   Net -1486.22 ml           Body habitus: overweight.  GENERAL APPEARANCE: NAD  PSYCHIATRY: Orientation: Ox3. Mood: cooperative  EYES: Conjunctivae: normal. Pupils: reactive to light  NECK: Trachea is in midline. Thyroid: not enlarged  RESPIRATORY: Respiratory effort: normal. Auscultation: bibasilar  crackles  CARDIOVASCULAR: Auscultation: RRR, no mRG. Carotids: no bruits. Edema: 2+ up to the knees bilaterally.. Pedal pulse: difficult to palpate  GASTROINTESTINAL: Soft, nontender, nondistended.   EXTREMITIES:  +discoloration of toes  SKIN: Warm and dry.       LABS:   Labs were reviewed. Pertinent findings are highlighted under assessment and plan.    RFP:   Recent Labs     10/06/24  0101 10/07/24  0427 10/08/24  0608    141 142   K 4.1 3.3* 3.2*    103 104   CO2 19* 24 23   BUN 36* 34* 33*   CREATININE 1.6* 1.4* 1.5*   MG  --  2.20 2.10       Liver panel:  Recent Labs     10/06/24  0101   AST 26   ALT 32       Endocrine:   @[id number](VitD,PTH,TSH,aldosterone,renin activity,cortisol,metanephrine)@    CBC:   Recent Labs     10/06/24  0101 10/07/24  0427   WBC 6.4 6.0   HGB 13.5 13.2*   HCT 42.4 39.8*   MCV 81.3 79.9*    166         ASSESSMENT and PLAN:     1. BETINA on CKD3a (baseline SCr 1.1-1.3): BETINA is likely in the setting of CRS (ADHF). Cholesterol embolization (concerns with toe discoloration) is less likely.   He is fluid overloaded with 20Lbs of wt gain.    - Diurese ++. Continue lasix drip at 10mg/hour.    - Monitor response and pursue further w/u accordingly   - Avoid drops in SBP to < 100   - okay to start jardiance    2. ADHF on chronic systolic HF (EF 25%) with G2DD: diuresis per above. Resume jardiance.     3. CAD with recent PCI on 9/6/24    4. New onset A fib/now in flutter with 3:1: per cardiology       Signed By: Viral Berry DO

## 2024-10-08 NOTE — PLAN OF CARE
Problem: Chronic Conditions and Co-morbidities  Goal: Patient's chronic conditions and co-morbidity symptoms are monitored and maintained or improved  Outcome: Progressing  Flowsheets (Taken 10/8/2024 0827)  Care Plan - Patient's Chronic Conditions and Co-Morbidity Symptoms are Monitored and Maintained or Improved: Monitor and assess patient's chronic conditions and comorbid symptoms for stability, deterioration, or improvement     Problem: Discharge Planning  Goal: Discharge to home or other facility with appropriate resources  Outcome: Progressing  Flowsheets (Taken 10/8/2024 0827)  Discharge to home or other facility with appropriate resources: Identify barriers to discharge with patient and caregiver     Problem: Pain  Goal: Verbalizes/displays adequate comfort level or baseline comfort level  Outcome: Progressing     Problem: Safety - Adult  Goal: Free from fall injury  Outcome: Progressing     Problem: ABCDS Injury Assessment  Goal: Absence of physical injury  Outcome: Progressing

## 2024-10-08 NOTE — PROGRESS NOTES
[facility]  HEART FAILURE  Progress Note      Admit Date 10/5/2024     Reason for Consult:      Reason for Consultation/Chief Complaint: leg swelling    HPI:    Ariel Garcia is a 68 y.o. male with PMH CAD, PCI, ICM, HFrEF, HTN, HLD and tobacco use admitted with SOB and edema, new onset Afl.       Subjective:  Patient is being seen for CHF. There were no acute overnight cardiac events.   Today Mr. Garcia is about the same the best I can tell from ROS. He c/o continued edema but denies SOB, chest pain, palpitations, or dizziness      Baseline Weight: 180 per pt   Wt Readings from Last 3 Encounters:   10/08/24 85.3 kg (188 lb)   09/18/24 84.5 kg (186 lb 3.2 oz)   09/06/24 88.9 kg (196 lb)         Cardiac Testing:   Cath: 8/21/2024  Left Anterior Descending   Collaterals   Dist LAD filled by collaterals from 1st Sept.       Mid LAD lesion, 80% stenosed. The lesion is segmental.   Dist LAD lesion, 100% stenosed. Diagnostic coronary angiography shows positive for .      First Diagonal Branch   1st Diag lesion, 95% stenosed. The lesion is segmental.      Second Diagonal Branch   2nd Diag lesion, 95% stenosed.      Left Circumflex   Mid Cx to Dist Cx lesion, 90% stenosed. The lesion is segmental.      Right Coronary Artery   Mid RCA lesion, 80% stenosed.      Right Posterior Descending Artery   RPDA lesion, 99% stenosed.         Echo: 8/20/2024  Left Ventricle Severely reduced left ventricular systolic function. EF by visual approximation is 25%. EF by 2D Simpsons Biplane is 26%. Left ventricle size is normal. Mildly increased wall thickness. Severe global hypokinesis present. Grade II diastolic dysfunction with increased LAP. Average E/e' ratio is 11.0.   Left Atrium Left atrium is severely dilated.   Right Ventricle Right ventricle size is normal. Reduced systolic function. TAPSE is 1.3 cm. RV Peak S' is 8 cm/s.   Right Atrium Right atrium is mildly dilated.   Aortic Valve Valve structure is normal. Trace

## 2024-10-08 NOTE — PLAN OF CARE
Problem: Chronic Conditions and Co-morbidities  Goal: Patient's chronic conditions and co-morbidity symptoms are monitored and maintained or improved  10/7/2024 2023 by Rafia Parra RN  Outcome: Progressing  Flowsheets (Taken 10/7/2024 2002)  Care Plan - Patient's Chronic Conditions and Co-Morbidity Symptoms are Monitored and Maintained or Improved: Monitor and assess patient's chronic conditions and comorbid symptoms for stability, deterioration, or improvement  10/7/2024 1825 by Chiqui Escoto, RN  Outcome: Progressing     Problem: Discharge Planning  Goal: Discharge to home or other facility with appropriate resources  10/7/2024 2023 by Rafia Parra RN  Outcome: Progressing  Flowsheets (Taken 10/7/2024 2002)  Discharge to home or other facility with appropriate resources:   Identify barriers to discharge with patient and caregiver   Arrange for needed discharge resources and transportation as appropriate   Identify discharge learning needs (meds, wound care, etc)  10/7/2024 1825 by Chiqui Escoto RN  Outcome: Progressing     Problem: Pain  Goal: Verbalizes/displays adequate comfort level or baseline comfort level  10/7/2024 2023 by Rafia Parra RN  Outcome: Progressing  10/7/2024 1825 by Chiqui Escoto RN  Outcome: Progressing     Problem: Safety - Adult  Goal: Free from fall injury  10/7/2024 2023 by Rafia Parra RN  Outcome: Progressing  10/7/2024 1825 by Chiqui Escoto RN  Outcome: Progressing     Problem: ABCDS Injury Assessment  Goal: Absence of physical injury  10/7/2024 2023 by Rafia Parra RN  Outcome: Progressing  10/7/2024 1825 by Chiqui Escoto RN  Outcome: Progressing

## 2024-10-09 LAB
ANION GAP SERPL CALCULATED.3IONS-SCNC: 14 MMOL/L (ref 3–16)
BUN SERPL-MCNC: 33 MG/DL (ref 7–20)
CALCIUM SERPL-MCNC: 8.9 MG/DL (ref 8.3–10.6)
CHLORIDE SERPL-SCNC: 104 MMOL/L (ref 99–110)
CO2 SERPL-SCNC: 23 MMOL/L (ref 21–32)
CREAT SERPL-MCNC: 1.4 MG/DL (ref 0.8–1.3)
GFR SERPLBLD CREATININE-BSD FMLA CKD-EPI: 54 ML/MIN/{1.73_M2}
GLUCOSE SERPL-MCNC: 109 MG/DL (ref 70–99)
MAGNESIUM SERPL-MCNC: 2.25 MG/DL (ref 1.8–2.4)
NT-PROBNP SERPL-MCNC: ABNORMAL PG/ML (ref 0–124)
POTASSIUM SERPL-SCNC: 3.5 MMOL/L (ref 3.5–5.1)
SODIUM SERPL-SCNC: 141 MMOL/L (ref 136–145)

## 2024-10-09 PROCEDURE — 2580000003 HC RX 258: Performed by: INTERNAL MEDICINE

## 2024-10-09 PROCEDURE — 6360000002 HC RX W HCPCS: Performed by: INTERNAL MEDICINE

## 2024-10-09 PROCEDURE — 83735 ASSAY OF MAGNESIUM: CPT

## 2024-10-09 PROCEDURE — 6370000000 HC RX 637 (ALT 250 FOR IP)

## 2024-10-09 PROCEDURE — 99232 SBSQ HOSP IP/OBS MODERATE 35: CPT | Performed by: NURSE PRACTITIONER

## 2024-10-09 PROCEDURE — 6370000000 HC RX 637 (ALT 250 FOR IP): Performed by: HOSPITALIST

## 2024-10-09 PROCEDURE — 83880 ASSAY OF NATRIURETIC PEPTIDE: CPT

## 2024-10-09 PROCEDURE — 2060000000 HC ICU INTERMEDIATE R&B

## 2024-10-09 PROCEDURE — 6370000000 HC RX 637 (ALT 250 FOR IP): Performed by: NURSE PRACTITIONER

## 2024-10-09 PROCEDURE — 6370000000 HC RX 637 (ALT 250 FOR IP): Performed by: INTERNAL MEDICINE

## 2024-10-09 PROCEDURE — 36415 COLL VENOUS BLD VENIPUNCTURE: CPT

## 2024-10-09 PROCEDURE — 80048 BASIC METABOLIC PNL TOTAL CA: CPT

## 2024-10-09 RX ORDER — POTASSIUM CHLORIDE 1500 MG/1
20 TABLET, EXTENDED RELEASE ORAL ONCE
Status: COMPLETED | OUTPATIENT
Start: 2024-10-09 | End: 2024-10-09

## 2024-10-09 RX ADMIN — APIXABAN 5 MG: 5 TABLET, FILM COATED ORAL at 08:41

## 2024-10-09 RX ADMIN — ATORVASTATIN CALCIUM 80 MG: 80 TABLET, FILM COATED ORAL at 20:20

## 2024-10-09 RX ADMIN — SPIRONOLACTONE 12.5 MG: 25 TABLET ORAL at 08:41

## 2024-10-09 RX ADMIN — POTASSIUM CHLORIDE 20 MEQ: 1500 TABLET, EXTENDED RELEASE ORAL at 17:13

## 2024-10-09 RX ADMIN — FUROSEMIDE 10 MG/HR: 10 INJECTION, SOLUTION INTRAMUSCULAR; INTRAVENOUS at 18:15

## 2024-10-09 RX ADMIN — SODIUM CHLORIDE, PRESERVATIVE FREE 10 ML: 5 INJECTION INTRAVENOUS at 08:41

## 2024-10-09 RX ADMIN — CARVEDILOL 3.12 MG: 3.12 TABLET, FILM COATED ORAL at 08:41

## 2024-10-09 RX ADMIN — APIXABAN 5 MG: 5 TABLET, FILM COATED ORAL at 20:20

## 2024-10-09 RX ADMIN — SODIUM CHLORIDE, PRESERVATIVE FREE 10 ML: 5 INJECTION INTRAVENOUS at 20:20

## 2024-10-09 RX ADMIN — CARVEDILOL 3.12 MG: 3.12 TABLET, FILM COATED ORAL at 17:14

## 2024-10-09 RX ADMIN — PANTOPRAZOLE SODIUM 40 MG: 40 TABLET, DELAYED RELEASE ORAL at 06:06

## 2024-10-09 RX ADMIN — FUROSEMIDE 10 MG/HR: 10 INJECTION, SOLUTION INTRAMUSCULAR; INTRAVENOUS at 06:07

## 2024-10-09 RX ADMIN — EMPAGLIFLOZIN 10 MG: 10 TABLET, FILM COATED ORAL at 08:41

## 2024-10-09 RX ADMIN — CLOPIDOGREL BISULFATE 75 MG: 75 TABLET ORAL at 08:41

## 2024-10-09 NOTE — PROGRESS NOTES
Hospitalist Progress Note      Name:  Ariel Garcia /Age/Sex: 1955  (68 y.o. male)   MRN & CSN:  [id number] & [id number] Encounter Date/Time: 10/9/2024 10:34 AM EDT   Location:  [id number]/3385-01 PCP: Abraham Boothe DO     Attending:Wolfgang Zavala MD       Hospital Day: 5    Subjective:   Chief Complaint:   Chief Complaint   Patient presents with    Leg Swelling     Pt to ED via [facility] em[facility] from home with c/o lower extremity edema and shortness of breath that started about a week ago. Pt states he had 5 stents placed 2 weeks ago.      Ariel Garcia is a 68 y.o. male with a past medical history of hypertension, hyperlipidemia, CKD, CAD s/p DEXTER RCA, D1, LAD 2024, CMP EF 20% who presented with SOB and leg swelling.  Admitted for acute systolic CHF  Noted to have 20 lb weight gain. Treated with IV lasix.  Cardiology consulted.  Nephrology consulted for BETINA on CKD3a.     Recently admitted for SOB and NSTEMI s/p LHC showed MVD and he declined CABG or PCI at the time and was discharge with OP follow up 2024.  S/p OP LHC with intervention RCA/LAD and D1 as above .      Interval History:  Today, he is being seen for follow up.  Feeling better, swelling is improving.  Right foot continues with swelling, pain or localized joint swelling.  Has declined any sort of compression stocking.  No new symptoms today, renal fx stable on lasix gtt.     Independently reviewed interval ancillary notes from cardiology and nephrology.     Assessment and Recommendations   Problem List  Principal Problem:    Acute on chronic systolic CHF (congestive heart failure) (HCC)  Active Problems:    New onset atrial flutter (HCC)    Leg edema, right    HFrEF (heart failure with reduced ejection fraction) (HCC)  Resolved Problems:    * No resolved hospital problems. *     Assessment and Plan:    Acute on chronic sCHF  - Presented with worsening shortness of breath and swelling  - EF 25% on echo (2024)  - CT pulmonary  shows pleural effusions, ascites, negative for PE  - Continue carvedilol 3.125 mg  - HOLD Jardiance in setting of aggressive diuresis and BETINA  - No ACE/ARB/ARNi secondary CKD and hypotension  - Started on spironolactone 12.5 mg daily per cardiology  - Has been noncompliant with low sodium diet  - Continue lasix gtt 10 mg/hour  - Cardiology following continue lasix gtt      CAD   - S/p angioplasty and stenting of RCA/LAD  and diagonal   9/16/2024  - No reports og angina  - STOP ASA and continue Plavix with Eliquis in setting of new aflutter  - Interventional cardiology evaluated with above recs     BETINA on CKD  - Creatinine 1.6 on presentation (baseline 1.3)  - Volume overloaded, pleural effusions and ascites noted on CT scan  - Likely CRS  - Avoid nephrotoxins  - Nephrology following, on lasix gtt 10 mg/hour      New onset atrial flutter  - Presented in atrial flutter with   - aflutter/CVR on telemetry   - Continue carvedilol  - EP evaluated, high risk [id number] score and AC recommended, however, currently on DAPT and triple therapy is not recommended due to high risk of bleeding  - Interventional cardiology discussed R/B and will drop ASA and started Eliquis      Hypokalemia  - Potassium 3.5, replacement protocol   - Mag 2.1  - Started on spironolactone 12.5 mg daily       Tobacco use  - Encouraged smoking cessation    Plan:   Continue IV lasix gtt  He is agreeable to compression stockings/ace wrap after discussion  Right leg swelling with no localized edema in setting of CHF, no joint swelling or pain, no recent trauma to area, no concern for gout other than elevated uric acid, he has no pain and no erythema, no signs or symptoms of cellulitis.  Swelling is improved. Negative for DVT  BMP, CBC and mag in am     Drugs that require monitoring for toxicity include: IV lasix and the method of monitoring was/is daily BMP and mag .    Discussed care with patient and nursing.   Discussed case with

## 2024-10-09 NOTE — PLAN OF CARE
Problem: Chronic Conditions and Co-morbidities  Goal: Patient's chronic conditions and co-morbidity symptoms are monitored and maintained or improved  Outcome: Progressing     Problem: Discharge Planning  Goal: Discharge to home or other facility with appropriate resources  Outcome: Progressing    Problem: Pain  Goal: Verbalizes/displays adequate comfort level or baseline comfort level  Outcome: Progressing     Problem: Safety - Adult  Goal: Free from fall injury  Outcome: Progressing     Problem: ABCDS Injury Assessment  Goal: Absence of physical injury  Outcome: Progressing

## 2024-10-09 NOTE — PROGRESS NOTES
[facility]  HEART FAILURE  Progress Note      Admit Date 10/5/2024     Reason for Consult:      Reason for Consultation/Chief Complaint: leg swelling    HPI:    Ariel Garcia is a 68 y.o. male with PMH CAD, PCI, ICM, HFrEF, HTN, HLD and tobacco use admitted with SOB and edema, new onset Afl.       Subjective:  Patient is being seen for CHF. There were no acute overnight cardiac events.   Today Mr. Garcia is about the same.  He c/o continued edema in his right foot but denies SOB, chest pain, palpitations, or dizziness      Baseline Weight: 180 per pt   Wt Readings from Last 3 Encounters:   10/09/24 84.6 kg (186 lb 6.4 oz)   09/18/24 84.5 kg (186 lb 3.2 oz)   09/06/24 88.9 kg (196 lb)         Cardiac Testing:   Cath: 8/21/2024  Left Anterior Descending   Collaterals   Dist LAD filled by collaterals from 1st Sept.       Mid LAD lesion, 80% stenosed. The lesion is segmental.   Dist LAD lesion, 100% stenosed. Diagnostic coronary angiography shows positive for .      First Diagonal Branch   1st Diag lesion, 95% stenosed. The lesion is segmental.      Second Diagonal Branch   2nd Diag lesion, 95% stenosed.      Left Circumflex   Mid Cx to Dist Cx lesion, 90% stenosed. The lesion is segmental.      Right Coronary Artery   Mid RCA lesion, 80% stenosed.      Right Posterior Descending Artery   RPDA lesion, 99% stenosed.         Echo: 8/20/2024  Left Ventricle Severely reduced left ventricular systolic function. EF by visual approximation is 25%. EF by 2D Simpsons Biplane is 26%. Left ventricle size is normal. Mildly increased wall thickness. Severe global hypokinesis present. Grade II diastolic dysfunction with increased LAP. Average E/e' ratio is 11.0.   Left Atrium Left atrium is severely dilated.   Right Ventricle Right ventricle size is normal. Reduced systolic function. TAPSE is 1.3 cm. RV Peak S' is 8 cm/s.   Right Atrium Right atrium is mildly dilated.   Aortic Valve Valve structure is normal. Trace  CNP, XIMENAP, RONEYNP 10/9/2024, 10:03 AM  Heart Failure  [facility] [place] [address]  Ph: [phone]

## 2024-10-09 NOTE — PROGRESS NOTES
Visit us at [facility] or call us at [address] [place] [address]    NEPHROLOGY ATTENDING CONSULTATION NOTE    Patient: Ariel Garcia MRN: [id number]     YOB: 1955  Age: 68 y.o.  Sex: male    Unit: [address] Room/Bed: [id number]/3385-01 Location: [facility]     Admitting Physician: FELICIA MARTINS    Primary Care Physician: Abraham Boothe DO          LOS: 3 days       DATE OF CONSULTATION:   October 9, 2024      SOURCE:   History obtained from patient, family(extended family was in the room) and EHR      REASON FOR CONSULTATION:   I was asked to see Mr Garcia in consultation by Dr Yoder for the evaluation and management of BETINA on CKD3a.      HISTORY OF PRESENT ILLNESS:   This is a 68 y.o. WM who presented on 10/5/24 with increased SOB, wt gain (20Lbs) and LE edema that has progressed in the past 2 weeks. He has CAD and required extensive PCI on 9/6/24 with placement of 5 stents. His BNP was elevated at 16,539. He admits to not following a low Na diet or FR. His SCr was noted to be elevated at 1.6 (baseline 1.1-1.3).     Interval history  Diuresing with lasix drip.   Weight coming down slowly      PHYSICAL EXAM:   CONSTITUTIONAL:            Vitals:    10/08/24 2330 10/09/24 0550 10/09/24 0649 10/09/24 0841   BP: 103/72 126/87  113/77   Pulse: 90 97  90   Resp: 16 16  18   Temp: 97.5 °F (36.4 °C)   97.8 °F (36.6 °C)   TempSrc: Oral   Oral   SpO2: 93% 96%  92%   Weight:   84.6 kg (186 lb 6.4 oz)    Height:                 Intake/Output Summary (Last 24 hours) at 10/9/2024 1138  Last data filed at 10/8/2024 1945  Gross per 24 hour   Intake --   Output 1000 ml   Net -1000 ml           Body habitus: overweight.  GENERAL APPEARANCE: NAD  PSYCHIATRY: Orientation: Ox3. Mood: cooperative  EYES: Conjunctivae: normal. Pupils: reactive to light  NECK: Trachea is in midline. Thyroid: not enlarged  RESPIRATORY: Respiratory effort: normal. Auscultation: bibasilar crackles  CARDIOVASCULAR: Auscultation:

## 2024-10-09 NOTE — PLAN OF CARE
Problem: Chronic Conditions and Co-morbidities  Goal: Patient's chronic conditions and co-morbidity symptoms are monitored and maintained or improved  Outcome: Progressing  Flowsheets (Taken 10/9/2024 0841)  Care Plan - Patient's Chronic Conditions and Co-Morbidity Symptoms are Monitored and Maintained or Improved: Monitor and assess patient's chronic conditions and comorbid symptoms for stability, deterioration, or improvement     Problem: Discharge Planning  Goal: Discharge to home or other facility with appropriate resources  Outcome: Progressing  Flowsheets (Taken 10/9/2024 0841)  Discharge to home or other facility with appropriate resources: Identify barriers to discharge with patient and caregiver     Problem: Pain  Goal: Verbalizes/displays adequate comfort level or baseline comfort level  Outcome: Progressing     Problem: Safety - Adult  Goal: Free from fall injury  Outcome: Progressing     Problem: ABCDS Injury Assessment  Goal: Absence of physical injury  Outcome: Progressing

## 2024-10-09 NOTE — PROGRESS NOTES
Physician Progress Note      PATIENT:               TRICIA GUTIERREZ  CSN #:                  [id number]  :                       1955  ADMIT DATE:       10/5/2024 11:24 PM  DISCH DATE:  RESPONDING  PROVIDER #:        TUCKER GREENE - CNP          QUERY TEXT:    Pt admitted with SOB and Bilat LE edema/swelling . Pt noted to also have CAD,   ICMP, HTN and new onset A flutter . If possible, please document in progress   notes and discharge summary the etiology of CHF, if able to be determined.  The medical record reflects the following:  Risk Factors: ICMP, CAD, HTN, Aflutter, non-compliance with medication and   diet, smoker  Clinical Indicators: on admission- BNP- 16,000, HR--120's, 130's, 3+ edema BLE  H&P 10/6--\"2.  Hypertension blood pressure was okay initially last 1 was   108/87, was to monitor closely as patient is Lasix IV in addition to being on   Coreg for any hypotension related to Lasix IV.\"  EKG 10/6--A flutter  10/6 im pn--\"  CAD-- S/p angioplasty and stenting of RCA/LAD  and diagonal.   New onset atrial flutter  - Presented in atrial flutter with .\"  10/7 cards pn--\"New a-flutter, MVCAD s/p PCI to LAD, RCA, and D1 2024,   Severe LV dysfunction, ischemic cardiomyopathy  Treatment: IV lasix, labs, CT, tele, cards consult, EKG, Cxray, essential home   meds, supportive care    Thank you,  Martin Taylor RN,BSB  Options provided:  -- CHF due to Hypertensive Heart Disease  -- CHF due to Hypertensive Heart Disease and CAD  -- CHF not due to Hypertension but due to CAD  -- CHF due to Hypertensive Heart Disease and ICMP  -- CHF not due to Hypertension but due to ICMP  -- CHF due to Hypertensive Heart Disease and new onset Aflutter  -- CHF not due to Hypertension but due to new onset Aflutter  -- CHF due to HTN, CAD, ICMP and new onset Aflutter  -- Other - I will add my own diagnosis  -- Disagree - Not applicable / Not valid  -- Disagree - Clinically unable to determine / Unknown  --

## 2024-10-10 ENCOUNTER — APPOINTMENT (OUTPATIENT)
Dept: VASCULAR LAB | Age: 69
End: 2024-10-10
Payer: MEDICARE

## 2024-10-10 LAB
ANION GAP SERPL CALCULATED.3IONS-SCNC: 14 MMOL/L (ref 3–16)
BACTERIA BLD CULT ORG #2: NORMAL
BACTERIA BLD CULT: NORMAL
BUN SERPL-MCNC: 32 MG/DL (ref 7–20)
CALCIUM SERPL-MCNC: 9 MG/DL (ref 8.3–10.6)
CHLORIDE SERPL-SCNC: 103 MMOL/L (ref 99–110)
CO2 SERPL-SCNC: 22 MMOL/L (ref 21–32)
CREAT SERPL-MCNC: 1.5 MG/DL (ref 0.8–1.3)
GFR SERPLBLD CREATININE-BSD FMLA CKD-EPI: 50 ML/MIN/{1.73_M2}
GLUCOSE SERPL-MCNC: 113 MG/DL (ref 70–99)
MAGNESIUM SERPL-MCNC: 2.37 MG/DL (ref 1.8–2.4)
POTASSIUM SERPL-SCNC: 3.5 MMOL/L (ref 3.5–5.1)
SODIUM SERPL-SCNC: 139 MMOL/L (ref 136–145)

## 2024-10-10 PROCEDURE — 36415 COLL VENOUS BLD VENIPUNCTURE: CPT

## 2024-10-10 PROCEDURE — 2580000003 HC RX 258: Performed by: INTERNAL MEDICINE

## 2024-10-10 PROCEDURE — 2060000000 HC ICU INTERMEDIATE R&B

## 2024-10-10 PROCEDURE — 99232 SBSQ HOSP IP/OBS MODERATE 35: CPT | Performed by: NURSE PRACTITIONER

## 2024-10-10 PROCEDURE — 6370000000 HC RX 637 (ALT 250 FOR IP)

## 2024-10-10 PROCEDURE — 6370000000 HC RX 637 (ALT 250 FOR IP): Performed by: HOSPITALIST

## 2024-10-10 PROCEDURE — 83735 ASSAY OF MAGNESIUM: CPT

## 2024-10-10 PROCEDURE — 80048 BASIC METABOLIC PNL TOTAL CA: CPT

## 2024-10-10 PROCEDURE — 6360000002 HC RX W HCPCS: Performed by: INTERNAL MEDICINE

## 2024-10-10 PROCEDURE — 94760 N-INVAS EAR/PLS OXIMETRY 1: CPT

## 2024-10-10 PROCEDURE — 6370000000 HC RX 637 (ALT 250 FOR IP): Performed by: INTERNAL MEDICINE

## 2024-10-10 RX ORDER — TORSEMIDE 20 MG/1
50 TABLET ORAL DAILY
Status: DISCONTINUED | OUTPATIENT
Start: 2024-10-11 | End: 2024-10-11 | Stop reason: HOSPADM

## 2024-10-10 RX ADMIN — FUROSEMIDE 10 MG/HR: 10 INJECTION, SOLUTION INTRAMUSCULAR; INTRAVENOUS at 04:42

## 2024-10-10 RX ADMIN — SODIUM CHLORIDE, PRESERVATIVE FREE 10 ML: 5 INJECTION INTRAVENOUS at 10:00

## 2024-10-10 RX ADMIN — EMPAGLIFLOZIN 10 MG: 10 TABLET, FILM COATED ORAL at 09:59

## 2024-10-10 RX ADMIN — CARVEDILOL 3.12 MG: 3.12 TABLET, FILM COATED ORAL at 16:59

## 2024-10-10 RX ADMIN — CARVEDILOL 3.12 MG: 3.12 TABLET, FILM COATED ORAL at 09:59

## 2024-10-10 RX ADMIN — ATORVASTATIN CALCIUM 80 MG: 80 TABLET, FILM COATED ORAL at 20:12

## 2024-10-10 RX ADMIN — APIXABAN 5 MG: 5 TABLET, FILM COATED ORAL at 09:59

## 2024-10-10 RX ADMIN — CLOPIDOGREL BISULFATE 75 MG: 75 TABLET ORAL at 09:59

## 2024-10-10 RX ADMIN — PANTOPRAZOLE SODIUM 40 MG: 40 TABLET, DELAYED RELEASE ORAL at 05:25

## 2024-10-10 RX ADMIN — APIXABAN 5 MG: 5 TABLET, FILM COATED ORAL at 20:12

## 2024-10-10 RX ADMIN — SODIUM CHLORIDE, PRESERVATIVE FREE 10 ML: 5 INJECTION INTRAVENOUS at 20:12

## 2024-10-10 ASSESSMENT — PAIN SCALES - GENERAL
PAINLEVEL_OUTOF10: 0
PAINLEVEL_OUTOF10: 0

## 2024-10-10 NOTE — PROGRESS NOTES
Hospitalist Progress Note      Name:  Ariel Garcia /Age/Sex: 1955  (68 y.o. male)   MRN & CSN:  [id number] & [id number] Encounter Date/Time: 10/10/2024 10:34 AM EDT   Location:  [facility]3385/3385-01 PCP: Abraham Boothe DO     Attending:Wolfgang Zavala MD       Hospital Day: 6    Subjective:   Chief Complaint:   Chief Complaint   Patient presents with    Leg Swelling     Pt to ED via [facility] ems from home with c/o lower extremity edema and shortness of breath that started about a week ago. Pt states he had 5 stents placed 2 weeks ago.      Ariel Garcia is a 68 y.o. male with a past medical history of hypertension, hyperlipidemia, CKD, CAD s/p DEXTER RCA, D1, LAD 2024, CMP EF 20% who presented with SOB and leg swelling.  Admitted for acute systolic CHF  Noted to have 20 lb weight gain. Treated with IV lasix.  Cardiology consulted.  Nephrology consulted for BETINA on CKD3a.     Recently admitted for SOB and NSTEMI s/p LHC showed MVD and he declined CABG or PCI at the time and was discharge with OP follow up 2024.  S/p OP LHC with intervention RCA/LAD and D1 as above .      Interval History:  Today, he is resting swelling is gradually improving. He continues with RLE edema. No CP or SOB.  He is ambulating independently, he is nonadherant to keeping legs elevated, he cannot tolerate compression stockings.      Independently reviewed interval ancillary notes from cardiology and nephrology.     Assessment and Recommendations   Problem List  Principal Problem:    Acute on chronic systolic CHF (congestive heart failure) (HCC)  Active Problems:    New onset atrial flutter (HCC)    Leg edema, right    HFrEF (heart failure with reduced ejection fraction) (HCC)  Resolved Problems:    * No resolved hospital problems. *     Assessment and Plan:    Acute on chronic sCHF  - Presented with worsening shortness of breath and swelling  - EF 25% on echo (2024)  - CT pulmonary shows pleural effusions, ascites,

## 2024-10-10 NOTE — PROGRESS NOTES
[facility]  HEART FAILURE  Progress Note      Admit Date 10/5/2024     Reason for Consult:      Reason for Consultation/Chief Complaint: leg swelling    HPI:    Ariel Garcia is a 68 y.o. male with PMH CAD, PCI, ICM, HFrEF, HTN, HLD and tobacco use admitted with SOB and edema, new onset Afl.       Subjective:  Patient is being seen for CHF. There were no acute overnight cardiac events.   Today Mr. Garcia is up in the chair, feeling better and he denies SOB, chest pain, palpitations, or dizziness. Bedside wt done by me 182lb today      Baseline Weight: 180 per pt   Wt Readings from Last 3 Encounters:   10/10/24 84.4 kg (186 lb)   09/18/24 84.5 kg (186 lb 3.2 oz)   09/06/24 88.9 kg (196 lb)         Cardiac Testing:   Cath: 8/21/2024  Left Anterior Descending   Collaterals   Dist LAD filled by collaterals from 1st Sept.       Mid LAD lesion, 80% stenosed. The lesion is segmental.   Dist LAD lesion, 100% stenosed. Diagnostic coronary angiography shows positive for .      First Diagonal Branch   1st Diag lesion, 95% stenosed. The lesion is segmental.      Second Diagonal Branch   2nd Diag lesion, 95% stenosed.      Left Circumflex   Mid Cx to Dist Cx lesion, 90% stenosed. The lesion is segmental.      Right Coronary Artery   Mid RCA lesion, 80% stenosed.      Right Posterior Descending Artery   RPDA lesion, 99% stenosed.         Echo: 8/20/2024  Left Ventricle Severely reduced left ventricular systolic function. EF by visual approximation is 25%. EF by 2D Simpsons Biplane is 26%. Left ventricle size is normal. Mildly increased wall thickness. Severe global hypokinesis present. Grade II diastolic dysfunction with increased LAP. Average E/e' ratio is 11.0.   Left Atrium Left atrium is severely dilated.   Right Ventricle Right ventricle size is normal. Reduced systolic function. TAPSE is 1.3 cm. RV Peak S' is 8 cm/s.   Right Atrium Right atrium is mildly dilated.   Aortic Valve Valve structure is normal. Trace  pantoprazole  40 mg Oral QAM AC    sodium chloride flush  5-40 mL IntraVENous 2 times per day     Continuous Infusions:   sodium chloride      furosemide (LASIX) 100 mg in sodium chloride 0.9 % 100 mL infusion 10 mg/hr (10/10/24 0442)     PRN Meds:.sodium chloride flush, sodium chloride, potassium chloride **OR** potassium alternative oral replacement **OR** potassium chloride, magnesium sulfate, ondansetron **OR** ondansetron, polyethylene glycol, acetaminophen **OR** acetaminophen  Continuous Infusions:   sodium chloride      furosemide (LASIX) 100 mg in sodium chloride 0.9 % 100 mL infusion 10 mg/hr (10/10/24 0442)       Intake/Output Summary (Last 24 hours) at 10/10/2024 0857  Last data filed at 10/10/2024 0612  Gross per 24 hour   Intake --   Output 700 ml   Net -700 ml       Lab Data:  CBC:   Lab Results   Component Value Date/Time    WBC 6.0 10/07/2024 04:27 AM    HGB 13.2 10/07/2024 04:27 AM     10/07/2024 04:27 AM     BMP:  Lab Results   Component Value Date/Time     10/10/2024 04:52 AM    K 3.5 10/10/2024 04:52 AM     10/10/2024 04:52 AM    CO2 22 10/10/2024 04:52 AM    BUN 32 10/10/2024 04:52 AM    CREATININE 1.5 10/10/2024 04:52 AM    GLUCOSE 113 10/10/2024 04:52 AM     INR: No results found for: \"INR\"     CARDIAC LABS  ENZYMES:No results for input(s): \"CKMB\", \"CKMBINDEX\", \"TROPONINI\" in the last 72 hours.    Invalid input(s): \"CKTOTAL;3\"  FASTING LIPID PANEL:  Lab Results   Component Value Date/Time    HDL 21 08/21/2024 05:05 AM    TRIG 101 08/21/2024 05:05 AM    TSH 7.82 10/08/2024 06:08 AM     LIVER PROFILE:  Lab Results   Component Value Date/Time    AST 26 10/06/2024 01:01 AM    AST 22 09/18/2024 03:40 PM    ALT 32 10/06/2024 01:01 AM    ALT 19 09/18/2024 03:40 PM     BNP:   Lab Results   Component Value Date/Time    PROBNP 11,871 10/09/2024 04:33 AM    PROBNP 16,539 10/06/2024 01:01 AM    PROBNP 8,358 08/22/2024 05:17 AM    PROBNP 16,528 08/19/2024 10:41 PM     Iron Studies:

## 2024-10-10 NOTE — PROGRESS NOTES
Patient refused lower extremity doppler. Education provided. Risks explained. Patient states he \"signed the discharge paperwork already\" which can not be true because I did not bring him any discharge paperwork nor does he have an order.   Siomara Cook RN

## 2024-10-10 NOTE — PROGRESS NOTES
Visit us at [employer] or call us at [address] [place] [address]    NEPHROLOGY ATTENDING CONSULTATION NOTE    Patient: Ariel Garcia MRN: [id number]     YOB: 1955  Age: 68 y.o.  Sex: male    [address] Room[address]: 3TN-3385/3385-01 Location: [facility]     Admitting Physician: FELICIA MARTINS    Primary Care Physician: Abraham Boothe DO          LOS: 4 days       DATE OF CONSULTATION:   October 10, 2024      SOURCE:   History obtained from patient, family(extended family was in the room) and EHR      REASON FOR CONSULTATION:   I was asked to see Mr Garcia in consultation by Dr Yoder for the evaluation and management of BETINA on CKD3a.      HISTORY OF PRESENT ILLNESS:   This is a 68 y.o. WM who presented on 10/5/24 with increased SOB, wt gain (20Lbs) and LE edema that has progressed in the past 2 weeks. He has CAD and required extensive PCI on 9/6/24 with placement of 5 stents. His BNP was elevated at 16,539. He admits to not following a low Na diet or FR. His SCr was noted to be elevated at 1.6 (baseline 1.1-1.3).     Interval history  Diuresing with lasix drip.   Weight coming down  Minimal edema now  Wife at bedside.       PHYSICAL EXAM:   CONSTITUTIONAL:            Vitals:    10/10/24 0356 10/10/24 0554 10/10/24 0957 10/10/24 1151   BP: 117/80  103/80    Pulse: 78  94 96   Resp: 16  18 18   Temp: 97.7 °F (36.5 °C)  97.7 °F (36.5 °C)    TempSrc: Oral  Oral    SpO2: 97%  94% 96%   Weight:  84.4 kg (186 lb)     Height:                 Intake/Output Summary (Last 24 hours) at 10/10/2024 1327  Last data filed at 10/10/2024 0612  Gross per 24 hour   Intake --   Output 700 ml   Net -700 ml           Body habitus: overweight.  GENERAL APPEARANCE: NAD  PSYCHIATRY: Orientation: Ox3. Mood: cooperative  EYES: Conjunctivae: normal. Pupils: reactive to light  NECK: Trachea is in midline. Thyroid: not enlarged  RESPIRATORY: Respiratory effort: normal. Auscultation: bibasilar

## 2024-10-10 NOTE — CARE COORDINATION
10/10/24 1307   IMM Letter   IMM Letter given to Patient/Family/Significant other/Guardian/POA/by: IMM given by CM   IMM Letter date given: 10/10/24   IMM Letter time given: 2547

## 2024-10-11 VITALS
RESPIRATION RATE: 18 BRPM | WEIGHT: 186 LBS | DIASTOLIC BLOOD PRESSURE: 83 MMHG | BODY MASS INDEX: 26.04 KG/M2 | HEART RATE: 114 BPM | SYSTOLIC BLOOD PRESSURE: 119 MMHG | TEMPERATURE: 97.6 F | OXYGEN SATURATION: 96 % | HEIGHT: 71 IN

## 2024-10-11 LAB
ANION GAP SERPL CALCULATED.3IONS-SCNC: 12 MMOL/L (ref 3–16)
BUN SERPL-MCNC: 35 MG/DL (ref 7–20)
CALCIUM SERPL-MCNC: 8.9 MG/DL (ref 8.3–10.6)
CHLORIDE SERPL-SCNC: 101 MMOL/L (ref 99–110)
CO2 SERPL-SCNC: 26 MMOL/L (ref 21–32)
CREAT SERPL-MCNC: 1.4 MG/DL (ref 0.8–1.3)
GFR SERPLBLD CREATININE-BSD FMLA CKD-EPI: 54 ML/MIN/{1.73_M2}
GLUCOSE SERPL-MCNC: 138 MG/DL (ref 70–99)
MAGNESIUM SERPL-MCNC: 2.27 MG/DL (ref 1.8–2.4)
POTASSIUM SERPL-SCNC: 3.3 MMOL/L (ref 3.5–5.1)
SODIUM SERPL-SCNC: 139 MMOL/L (ref 136–145)

## 2024-10-11 PROCEDURE — 6370000000 HC RX 637 (ALT 250 FOR IP): Performed by: HOSPITALIST

## 2024-10-11 PROCEDURE — 99232 SBSQ HOSP IP/OBS MODERATE 35: CPT | Performed by: NURSE PRACTITIONER

## 2024-10-11 PROCEDURE — 6370000000 HC RX 637 (ALT 250 FOR IP): Performed by: INTERNAL MEDICINE

## 2024-10-11 PROCEDURE — 6370000000 HC RX 637 (ALT 250 FOR IP)

## 2024-10-11 PROCEDURE — 2580000003 HC RX 258: Performed by: INTERNAL MEDICINE

## 2024-10-11 PROCEDURE — 80048 BASIC METABOLIC PNL TOTAL CA: CPT

## 2024-10-11 PROCEDURE — 6360000002 HC RX W HCPCS: Performed by: INTERNAL MEDICINE

## 2024-10-11 PROCEDURE — 6370000000 HC RX 637 (ALT 250 FOR IP): Performed by: NURSE PRACTITIONER

## 2024-10-11 PROCEDURE — 83735 ASSAY OF MAGNESIUM: CPT

## 2024-10-11 PROCEDURE — 36415 COLL VENOUS BLD VENIPUNCTURE: CPT

## 2024-10-11 RX ORDER — SPIRONOLACTONE 25 MG/1
12.5 TABLET ORAL EVERY OTHER DAY
Qty: 8 TABLET | Refills: 0 | Status: SHIPPED | OUTPATIENT
Start: 2024-10-13

## 2024-10-11 RX ORDER — TORSEMIDE 20 MG/1
50 TABLET ORAL DAILY
Qty: 75 TABLET | Refills: 0 | Status: SHIPPED | OUTPATIENT
Start: 2024-10-12

## 2024-10-11 RX ORDER — POTASSIUM CHLORIDE 1500 MG/1
40 TABLET, EXTENDED RELEASE ORAL 2 TIMES DAILY WITH MEALS
Status: DISCONTINUED | OUTPATIENT
Start: 2024-10-11 | End: 2024-10-11 | Stop reason: HOSPADM

## 2024-10-11 RX ADMIN — APIXABAN 5 MG: 5 TABLET, FILM COATED ORAL at 10:10

## 2024-10-11 RX ADMIN — PANTOPRAZOLE SODIUM 40 MG: 40 TABLET, DELAYED RELEASE ORAL at 05:14

## 2024-10-11 RX ADMIN — SPIRONOLACTONE 12.5 MG: 25 TABLET ORAL at 10:24

## 2024-10-11 RX ADMIN — SODIUM CHLORIDE, PRESERVATIVE FREE 10 ML: 5 INJECTION INTRAVENOUS at 10:14

## 2024-10-11 RX ADMIN — CLOPIDOGREL BISULFATE 75 MG: 75 TABLET ORAL at 10:12

## 2024-10-11 RX ADMIN — EMPAGLIFLOZIN 10 MG: 10 TABLET, FILM COATED ORAL at 10:12

## 2024-10-11 RX ADMIN — CARVEDILOL 3.12 MG: 3.12 TABLET, FILM COATED ORAL at 10:24

## 2024-10-11 RX ADMIN — TORSEMIDE 50 MG: 20 TABLET ORAL at 10:10

## 2024-10-11 RX ADMIN — POTASSIUM CHLORIDE 40 MEQ: 1500 TABLET, EXTENDED RELEASE ORAL at 10:10

## 2024-10-11 RX ADMIN — SODIUM CHLORIDE 125 MG: 9 INJECTION, SOLUTION INTRAVENOUS at 12:11

## 2024-10-11 NOTE — DISCHARGE INSTR - COC
Continuity of Care Form    Patient Name: Ariel Garcia   :  1955  MRN:  [id number]    Admit date:  10/5/2024  Discharge date:  ***    Code Status Order: Full Code   Advance Directives:   Advance Care Flowsheet Documentation             Admitting Physician:  Ray Pollack MD  PCP: Abraham Boothe DO    Discharging Nurse: ***  Discharging Hospital Unit/Room#: 3TN-3385/3385-01  Discharging Unit Phone Number: ***    Emergency Contact:   Extended Emergency Contact Information  Primary Emergency Contact: Charlotte Garcia  Address: [address] [place], [place] [id number] [facility]  Home Phone: [phone]  Relation: Spouse    Past Surgical History:  Past Surgical History:   Procedure Laterality Date    CARDIAC PROCEDURE N/A 2024    Left heart cath / coronary angiography performed by Melvin Corral MD at [facility] CARDIAC CATH LAB    CARDIAC PROCEDURE N/A 2024    Percutaneous coronary intervention performed by Melvin Corral MD at [facility] CARDIAC CATH LAB    UPPER GASTROINTESTINAL ENDOSCOPY  2011    with biopsy       Immunization History:   Immunization History   Administered Date(s) Administered    COVID-19, MODERNA BLUE border, Primary or Immunocompromised, (age 12y+), IM, 100 mcg/0.5mL 2021, 06/10/2021, 2021, 2022    COVID-19, MODERNA Bivalent, (age 12y+), IM, 50 mcg/0.5 mL 10/11/2022       Active Problems:  Patient Active Problem List   Diagnosis Code    Chest pain R07.9    Essential hypertension I10    Hamm's esophagus K22.70    Anxiety F41.9    CAD (coronary artery disease) I25.10    Shortness of breath R06.02    Acute on chronic congestive heart failure (HCC) I50.9    Mixed hyperlipidemia E78.2    Acute on chronic systolic CHF (congestive heart failure) (HCC) I50.23    New onset atrial flutter (HCC) I48.92    Leg edema, right R60.0    HFrEF (heart failure with reduced ejection fraction) ([facility]) I50.20       Isolation/Infection:   Isolation            No  Isolation          Patient Infection Status       None to display            Nurse Assessment:  Last Vital Signs: BP (!) 122/93   Pulse 94   Temp 97.5 °F (36.4 °C) (Oral)   Resp 16   Ht 1.803 m (5' 11\")   Wt 84.4 kg (186 lb)   SpO2 94%   BMI 25.94 kg/m²     Last documented pain score (0-10 scale): Pain Level: 0  Last Weight:   Wt Readings from Last 1 Encounters:   10/10/24 84.4 kg (186 lb)     Mental Status:  {IP PT MENTAL STATUS:20030}    IV Access:  { MING IV ACCESS:891937536}    Nursing Mobility/ADLs:  Walking   {CHP DME ADLs:162965412}  Transfer  {CHP DME ADLs:127794712}  Bathing  {CHP DME ADLs:205790929}  Dressing  {CHP DME ADLs:591177422}  Toileting  {CHP DME ADLs:818208147}  Feeding  {CHP DME ADLs:061444680}  Med Admin  {CHP DME ADLs:017141245}  Med Delivery   { MING MED Delivery:114956044}    Wound Care Documentation and Therapy:  Wound 05/28/11 Other (Comment) Groin Right RT. GROIN CATH SITE COVERED WITH SMALL FOLDED 2X2AND OPSITE DRESSING D/I (Active)   Number of days: 4885        Elimination:  Continence:   Bowel: {YES / NO:19727}  Bladder: {YES / NO:19727}  Urinary Catheter: {Urinary Catheter:512157851}   Colostomy/Ileostomy/Ileal Conduit: {YES / NO:19727}       Date of Last BM: ***  No intake or output data in the 24 hours ending 10/11/24 1237  I/O last 3 completed shifts:  In: -   Out: 700 [Urine:700]    Safety Concerns:     { MING Safety Concerns:606265743}    Impairments/Disabilities:      { MING Impairments/Disabilities:053744396}    Nutrition Therapy:  Current Nutrition Therapy:   { MING Diet List:274259281}    Routes of Feeding: {CHP DME Other Feedings:120471873}  Liquids: {Slp liquid thickness:55582}  Daily Fluid Restriction: {CHP DME Yes amt example:318331468}  Last Modified Barium Swallow with Video (Video Swallowing Test): {Done Not Done Date:464263200}    Treatments at the Time of Hospital Discharge:   Respiratory Treatments: ***  Oxygen Therapy:  {Therapy; copd

## 2024-10-11 NOTE — PLAN OF CARE
Problem: Chronic Conditions and Co-morbidities  Goal: Patient's chronic conditions and co-morbidity symptoms are monitored and maintained or improved  10/11/2024 1436 by Lawrence Rodriguez RN  Outcome: Progressing  10/11/2024 0330 by Jovanna Lara RN  Outcome: Progressing     Problem: Discharge Planning  Goal: Discharge to home or other facility with appropriate resources  10/11/2024 1436 by Lawrence Rodriguez RN  Outcome: Progressing  10/11/2024 0330 by Jovanna Lara RN  Outcome: Progressing     Problem: Pain  Goal: Verbalizes/displays adequate comfort level or baseline comfort level  10/11/2024 1436 by Lawrence Rodriguez RN  Outcome: Progressing  10/11/2024 0330 by Jovanna Lara RN  Outcome: Progressing     Problem: Safety - Adult  Goal: Free from fall injury  10/11/2024 1436 by Lawrence Rodriugez RN  Outcome: Progressing  10/11/2024 0330 by Jovanna Lara RN  Outcome: Progressing     Problem: ABCDS Injury Assessment  Goal: Absence of physical injury  10/11/2024 1436 by Lawrence Rodriguez RN  Outcome: Progressing  10/11/2024 0330 by Jovanna Lara RN  Outcome: Progressing     Problem: Cardiovascular - Adult  Goal: Maintains optimal cardiac output and hemodynamic stability  Outcome: Progressing     Problem: Skin/Tissue Integrity - Adult  Goal: Incisions, wounds, or drain sites healing without S/S of infection  Outcome: Progressing     Problem: Metabolic/Fluid and Electrolytes - Adult  Goal: Electrolytes maintained within normal limits  Outcome: Progressing

## 2024-10-11 NOTE — PROGRESS NOTES
CLINICAL PHARMACY NOTE: MEDS TO BEDS    Total # of Prescriptions Filled: 3   The following medications were delivered to the patient:  ELIQUIS 5MG TABS  SPIRONOLACTONE 25MG TABS  TORSEMIDE 20MG TABS    Additional Documentation: Alexus HENRIQUEZ approved to deliver medications to patient room=signed  [facility]

## 2024-10-11 NOTE — DISCHARGE SUMMARY
[facility]    Patient Demographics    Patient. Ariel Garcia  Date of Birth. 1955  MRN. [id number]     Primary care provider. Abraham Boothe DO  (Tel: [phone])    Admit date: 10/5/2024    Discharge date (blank if same as Note Date):   Note Date: 10/11/2024     Reason for Hospitalization.   Chief Complaint   Patient presents with    Leg Swelling     Pt to ED via [facility] from home with c/o lower extremity edema and shortness of breath that started about a week ago. Pt states he had 5 stents placed 2 weeks ago.        Significant Findings.   Principal Problem:    Acute on chronic systolic CHF (congestive heart failure) (HCC)  Active Problems:    New onset atrial flutter (HCC)    Leg edema, right    HFrEF (heart failure with reduced ejection fraction) (HCC)  Resolved Problems:    * No resolved hospital problems. *     Problem-based Hospital Course.  Ariel Garcia is a 68 y.o. male with a past medical history of hypertension, hyperlipidemia, CKD, CAD s/p DEXTER RCA, D1, LAD 9/6/2024, CMP EF 20% who presented with SOB and leg swelling.  Admitted for acute systolic CHF  Noted to have 20 lb weight gain. Treated with IV lasix.  Cardiology consulted.  Nephrology consulted for BETINA on CKD3a.      Recently admitted for SOB and NSTEMI s/p LHC showed MVD and he declined CABG or PCI at the time and was discharge with OP follow up 8/22/2024.  S/p OP LHC with intervention RCA/LAD and D1 as above 9/6.      Cardiology consulted treated with IV lasix gtt and diuresed.  Started on Eliquis for new AF, ASA was stopped due to risk for bleeding.  Nephrology consulted and transitioned to torsemide 50 mg daily at discharge with OP follow up.  Cardiology HOLDING on Entresto due to soft BP.  HR have been controlled on Coreg, will need monitoring as OP.     Home care nursing for CHF and AF education, labs.      Assessment and Plan:  Acute on chronic sCHF  - SOB and LE has  your doctor about these medications      atorvastatin 80 MG tablet  Commonly known as: LIPITOR  Take 1 tablet by mouth nightly     carvedilol 3.125 MG tablet  Commonly known as: COREG  Take 1 tablet by mouth 2 times daily (with meals)     clopidogrel 75 MG tablet  Commonly known as: Plavix  Take 1 tablet by mouth daily     empagliflozin 10 MG tablet  Commonly known as: Jardiance  Take 1 tablet by mouth daily     esomeprazole 40 MG delayed release capsule  Commonly known as: NEXIUM     furosemide 40 MG tablet  Commonly known as: Lasix  Take 1 tablet by mouth 2 times daily               Where to Get Your Medications        These medications were sent to [facility] [place], [place] - [facility] [address] - P [phone] - F [phone]  [address] [facility] [address]      Phone: [phone]   apixaban 5 MG Tabs tablet       Spent 35 minutes in discharge process.    Signed:  RAMONA Brewer CNP     10/11/2024 7:26 AM

## 2024-10-11 NOTE — PROGRESS NOTES
[facility]     Outpatient Follow Up Note    CHIEF COMPLAINT / HPI: Hospital Follow Up secondary to CHF    Hospital record has been reviewed  Hospital Course progressed as follows per discharge summary:   Reason for Hospitalization.        Chief Complaint   Patient presents with    Leg Swelling       Pt to ED via [facility] from home with c/o lower extremity edema and shortness of breath that started about a week ago. Pt states he had 5 stents placed 2 weeks ago.        Significant Findings.   Principal Problem:    Acute on chronic systolic CHF (congestive heart failure) (HCC)  Active Problems:    New onset atrial flutter (HCC)    Leg edema, right    HFrEF (heart failure with reduced ejection fraction) ([facility])  Resolved Problems:    * No resolved hospital problems. *     Problem-based Hospital Course.  Ariel Garcia is a 68 y.o. male with a past medical history of hypertension, hyperlipidemia, CKD, CAD s/p DEXTER RCA, D1, LAD 9/6/2024, CMP EF 20% who presented with SOB and leg swelling.  Admitted for acute systolic CHF  Noted to have 20 lb weight gain. Treated with IV lasix.  Cardiology consulted.  Nephrology consulted for BETINA on CKD3a.      Recently admitted for SOB and NSTEMI s/p LHC showed MVD and he declined CABG or PCI at the time and was discharge with OP follow up 8/22/2024.  S/p OP LHC with intervention RCA/LAD and D1 as above 9/6.       Cardiology consulted treated with IV lasix gtt and diuresed.  Started on Eliquis for new AF, ASA was stopped due to risk for bleeding.  Nephrology consulted and transitioned to torsemide 50 mg daily at discharge with OP follow up.  Cardiology HOLDING on Entresto due to soft BP.  HR have been controlled on Coreg, will need monitoring as OP.      Home care nursing for CHF and AF education, labs.       Assessment and Plan:  Acute on chronic sCHF  - SOB and LE has essentially resolved, trace RLE  - EF 25% on echo (8/2024)  - CT pulmonary shows pleural effusions, ascites,

## 2024-10-11 NOTE — PROGRESS NOTES
Data- discharge order received, pt verbalized agreement to discharge, disposition to previous residence, no needs for HHC/DME.     Action- discharge instructions prepared and given, pt verbalized understanding. Medication information packet given r/t NEW and/or CHANGED prescriptions emphasizing name/purpose/side effects, pt verbalized understanding. Discharge instruction summary: Diet- Low sodium diet, Activity- as tolerated, Primary Care Physician as follows: Abraham Boothe -[phone] f/u appointment per discharge,prescription medications filled at outpatient pharmacy. Inpatient surgical procedure precautions rCHF Education reviewed. Pt/ Family has had a total of 60 minutes CHF education this admission encounter.     1. WEIGHT: Admit Weight - Scale: 81.6 kg (180 lb) (10/05/24 3141)        Today  Weight - Scale: 84.4 kg (186 lb) (10/10/24 0590)       2. O2 SAT.: SpO2: 96 % (10/11/24 1319)    Response- Pt belongings gathered, IV removed. Disposition is home (no HHC/DME needs), transported with wife, taken to lobby via w/c no complications.

## 2024-10-11 NOTE — PROGRESS NOTES
Visit us at [employer] or call us at [address] [place] [address]    NEPHROLOGY ATTENDING CONSULTATION NOTE    Patient: Ariel Garcia MRN: [id number]     YOB: 1955  Age: 68 y.o.  Sex: male    [address] Room[address] 3TN-3385/3385-01 Location: [facility]     Admitting Physician: FELICIA MARTINS    Primary Care Physician: Abraham Boothe DO          LOS: 5 days       DATE OF CONSULTATION:   October 11, 2024      SOURCE:   History obtained from patient, family(extended family was in the room) and EHR      REASON FOR CONSULTATION:   I was asked to see Mr Garcia in consultation by Dr Yoder for the evaluation and management of BETINA on CKD3a.      HISTORY OF PRESENT ILLNESS:   This is a 68 y.o. WM who presented on 10/5/24 with increased SOB, wt gain (20Lbs) and LE edema that has progressed in the past 2 weeks. He has CAD and required extensive PCI on 9/6/24 with placement of 5 stents. His BNP was elevated at 16,539. He admits to not following a low Na diet or FR. His SCr was noted to be elevated at 1.6 (baseline 1.1-1.3).     Interval history  Stable and on torsemide now.   Waiting for final discharge paperwork      PHYSICAL EXAM:   CONSTITUTIONAL:            Vitals:    10/10/24 1915 10/10/24 2315 10/11/24 0441 10/11/24 0745   BP: 111/77 104/69 97/69 (!) 122/93   Pulse: 89 84 85 94   Resp: 18 16 16 16   Temp: 98 °F (36.7 °C) 97.9 °F (36.6 °C)  97.5 °F (36.4 °C)   TempSrc: Oral Oral  Oral   SpO2: 95% 94% 92% 94%   Weight:       Height:               No intake or output data in the 24 hours ending 10/11/24 1251          Body habitus: overweight.  GENERAL APPEARANCE: NAD  PSYCHIATRY: Orientation: Ox3. Mood: cooperative  EYES: Conjunctivae: normal. Pupils: reactive to light  NECK: Trachea is in midline. Thyroid: not enlarged  RESPIRATORY: Respiratory effort: normal. Auscultation: bibasilar crackles  CARDIOVASCULAR: Auscultation: RRR, no mRG.   GASTROINTESTINAL: Soft, nontender, nondistended.

## 2024-10-11 NOTE — PROGRESS NOTES
[facility]  HEART FAILURE  Progress Note      Admit Date 10/5/2024     Reason for Consult:      Reason for Consultation/Chief Complaint: leg swelling    HPI:    Ariel Garcia is a 68 y.o. male with PMH CAD, PCI, ICM, HFrEF, HTN, HLD and tobacco use admitted with SOB and edema, new onset Afl.       Subjective:  Patient is being seen for CHF. There were no acute overnight cardiac events.   Today Mr. Garcia is up in the chair, feeling better and he denies SOB, chest pain, palpitations, or dizziness. Bedside wt 181lb today      Baseline Weight: 180 per pt   Wt Readings from Last 3 Encounters:   10/10/24 84.4 kg (186 lb)   09/18/24 84.5 kg (186 lb 3.2 oz)   09/06/24 88.9 kg (196 lb)         Cardiac Testing:   Cath: 8/21/2024  Left Anterior Descending   Collaterals   Dist LAD filled by collaterals from 1st Sept.       Mid LAD lesion, 80% stenosed. The lesion is segmental.   Dist LAD lesion, 100% stenosed. Diagnostic coronary angiography shows positive for .      First Diagonal Branch   1st Diag lesion, 95% stenosed. The lesion is segmental.      Second Diagonal Branch   2nd Diag lesion, 95% stenosed.      Left Circumflex   Mid Cx to Dist Cx lesion, 90% stenosed. The lesion is segmental.      Right Coronary Artery   Mid RCA lesion, 80% stenosed.      Right Posterior Descending Artery   RPDA lesion, 99% stenosed.         Echo: 8/20/2024  Left Ventricle Severely reduced left ventricular systolic function. EF by visual approximation is 25%. EF by 2D Simpsons Biplane is 26%. Left ventricle size is normal. Mildly increased wall thickness. Severe global hypokinesis present. Grade II diastolic dysfunction with increased LAP. Average E/e' ratio is 11.0.   Left Atrium Left atrium is severely dilated.   Right Ventricle Right ventricle size is normal. Reduced systolic function. TAPSE is 1.3 cm. RV Peak S' is 8 cm/s.   Right Atrium Right atrium is mildly dilated.   Aortic Valve Valve structure is normal. Trace  BID WC    clopidogrel  75 mg Oral Daily    pantoprazole  40 mg Oral QAM AC    sodium chloride flush  5-40 mL IntraVENous 2 times per day     Continuous Infusions:   sodium chloride       PRN Meds:.sodium chloride flush, sodium chloride, potassium chloride **OR** potassium alternative oral replacement **OR** potassium chloride, magnesium sulfate, ondansetron **OR** ondansetron, polyethylene glycol, acetaminophen **OR** acetaminophen  Continuous Infusions:   sodium chloride       No intake or output data in the 24 hours ending 10/11/24 0901      Lab Data:  CBC:   Lab Results   Component Value Date/Time    WBC 6.0 10/07/2024 04:27 AM    HGB 13.2 10/07/2024 04:27 AM     10/07/2024 04:27 AM     BMP:  Lab Results   Component Value Date/Time     10/11/2024 04:37 AM    K 3.3 10/11/2024 04:37 AM     10/11/2024 04:37 AM    CO2 26 10/11/2024 04:37 AM    BUN 35 10/11/2024 04:37 AM    CREATININE 1.4 10/11/2024 04:37 AM    GLUCOSE 138 10/11/2024 04:37 AM     INR: No results found for: \"INR\"     CARDIAC LABS  ENZYMES:No results for input(s): \"CKMB\", \"CKMBINDEX\", \"TROPONINI\" in the last 72 hours.    Invalid input(s): \"CKTOTAL;3\"  FASTING LIPID PANEL:  Lab Results   Component Value Date/Time    HDL 21 08/21/2024 05:05 AM    TRIG 101 08/21/2024 05:05 AM    TSH 7.82 10/08/2024 06:08 AM     LIVER PROFILE:  Lab Results   Component Value Date/Time    AST 26 10/06/2024 01:01 AM    AST 22 09/18/2024 03:40 PM    ALT 32 10/06/2024 01:01 AM    ALT 19 09/18/2024 03:40 PM     BNP:   Lab Results   Component Value Date/Time    PROBNP 11,871 10/09/2024 04:33 AM    PROBNP 16,539 10/06/2024 01:01 AM    PROBNP 8,358 08/22/2024 05:17 AM    PROBNP 16,528 08/19/2024 10:41 PM     Iron Studies:    Lab Results   Component Value Date/Time    TIBC 330 10/07/2024 04:26 AM    FERRITIN 257.0 10/07/2024 04:26 AM     Lab Results   Component Value Date    IRON 40 (L) 10/07/2024    TIBC 330 10/07/2024    FERRITIN 257.0 10/07/2024      Iron

## 2024-10-14 ENCOUNTER — OFFICE VISIT (OUTPATIENT)
Dept: CARDIOLOGY CLINIC | Age: 69
End: 2024-10-14
Payer: MEDICARE

## 2024-10-14 VITALS
HEART RATE: 96 BPM | WEIGHT: 181.8 LBS | HEIGHT: 71 IN | OXYGEN SATURATION: 99 % | BODY MASS INDEX: 25.45 KG/M2 | SYSTOLIC BLOOD PRESSURE: 122 MMHG | DIASTOLIC BLOOD PRESSURE: 66 MMHG

## 2024-10-14 DIAGNOSIS — I50.23 ACUTE ON CHRONIC SYSTOLIC HEART FAILURE (HCC): Primary | ICD-10-CM

## 2024-10-14 DIAGNOSIS — I10 ESSENTIAL HYPERTENSION: ICD-10-CM

## 2024-10-14 DIAGNOSIS — E78.2 MIXED HYPERLIPIDEMIA: ICD-10-CM

## 2024-10-14 DIAGNOSIS — I25.118 CORONARY ARTERY DISEASE OF NATIVE ARTERY OF NATIVE HEART WITH STABLE ANGINA PECTORIS (HCC): ICD-10-CM

## 2024-10-14 PROCEDURE — 3078F DIAST BP <80 MM HG: CPT

## 2024-10-14 PROCEDURE — 1123F ACP DISCUSS/DSCN MKR DOCD: CPT

## 2024-10-14 PROCEDURE — 99214 OFFICE O/P EST MOD 30 MIN: CPT

## 2024-10-14 PROCEDURE — 3074F SYST BP LT 130 MM HG: CPT

## 2024-10-14 PROCEDURE — 93000 ELECTROCARDIOGRAM COMPLETE: CPT

## 2024-10-14 RX ORDER — METOPROLOL SUCCINATE 25 MG/1
TABLET, EXTENDED RELEASE ORAL
Qty: 90 TABLET | Refills: 1 | Status: SHIPPED | OUTPATIENT
Start: 2024-10-14

## 2024-10-14 RX ORDER — CEPHALEXIN 500 MG/1
500 CAPSULE ORAL 3 TIMES DAILY
Qty: 21 CAPSULE | Refills: 0 | Status: SHIPPED | OUTPATIENT
Start: 2024-10-14 | End: 2024-10-21

## 2024-10-14 NOTE — PATIENT INSTRUCTIONS
Keflex 500 mg three times a day for 7 days  Stop Carvedilol   Start Metoprolol 12.5 mg daily  Blood work in 1 week   Keep appointment with Dr. Fu

## 2024-10-21 ENCOUNTER — HOSPITAL ENCOUNTER (OUTPATIENT)
Age: 69
Discharge: HOME | End: 2024-10-21
Payer: MEDICARE

## 2024-10-21 DIAGNOSIS — I50.23 ACUTE ON CHRONIC SYSTOLIC HEART FAILURE (HCC): ICD-10-CM

## 2024-10-21 LAB
ANION GAP SERPL CALCULATED.3IONS-SCNC: 12 MMOL/L (ref 3–16)
BUN SERPL-MCNC: 23 MG/DL (ref 7–20)
CALCIUM SERPL-MCNC: 9.6 MG/DL (ref 8.3–10.6)
CHLORIDE SERPL-SCNC: 102 MMOL/L (ref 99–110)
CO2 SERPL-SCNC: 27 MMOL/L (ref 21–32)
CREAT SERPL-MCNC: 1.3 MG/DL (ref 0.8–1.3)
GFR SERPLBLD CREATININE-BSD FMLA CKD-EPI: 60 ML/MIN/{1.73_M2}
GLUCOSE SERPL-MCNC: 117 MG/DL (ref 70–99)
POTASSIUM SERPL-SCNC: 4.1 MMOL/L (ref 3.5–5.1)
SODIUM SERPL-SCNC: 141 MMOL/L (ref 136–145)

## 2024-10-21 PROCEDURE — 36415 COLL VENOUS BLD VENIPUNCTURE: CPT

## 2024-10-21 PROCEDURE — 80048 BASIC METABOLIC PNL TOTAL CA: CPT

## 2024-10-22 ENCOUNTER — TELEPHONE (OUTPATIENT)
Dept: CARDIOLOGY CLINIC | Age: 69
End: 2024-10-22

## 2024-10-22 ENCOUNTER — OFFICE VISIT (OUTPATIENT)
Dept: CARDIOLOGY CLINIC | Age: 69
End: 2024-10-22
Payer: MEDICARE

## 2024-10-22 VITALS
HEART RATE: 126 BPM | BODY MASS INDEX: 23.99 KG/M2 | WEIGHT: 172 LBS | OXYGEN SATURATION: 97 % | DIASTOLIC BLOOD PRESSURE: 100 MMHG | SYSTOLIC BLOOD PRESSURE: 120 MMHG

## 2024-10-22 DIAGNOSIS — I48.19 PERSISTENT ATRIAL FIBRILLATION (HCC): Primary | ICD-10-CM

## 2024-10-22 DIAGNOSIS — R09.89 DIMINISHED PULSES IN LOWER EXTREMITY: ICD-10-CM

## 2024-10-22 PROCEDURE — 1123F ACP DISCUSS/DSCN MKR DOCD: CPT | Performed by: INTERNAL MEDICINE

## 2024-10-22 PROCEDURE — 3080F DIAST BP >= 90 MM HG: CPT | Performed by: INTERNAL MEDICINE

## 2024-10-22 PROCEDURE — 3074F SYST BP LT 130 MM HG: CPT | Performed by: INTERNAL MEDICINE

## 2024-10-22 PROCEDURE — 99214 OFFICE O/P EST MOD 30 MIN: CPT | Performed by: INTERNAL MEDICINE

## 2024-10-22 RX ORDER — CEPHALEXIN 500 MG/1
500 CAPSULE ORAL 2 TIMES DAILY
Qty: 14 CAPSULE | Refills: 0 | Status: SHIPPED | OUTPATIENT
Start: 2024-10-22 | End: 2024-10-29

## 2024-10-22 NOTE — TELEPHONE ENCOUNTER
Vicki, please schedule JA w/ possible DCCV with Dr. Enamorado per Dr. Fu's request ([facility] saw him in the hospital recently). I went over pre-procedure instructions as noted below. Order has been placed.     It needs to be scheduled at least 2+ weeks out as he just started Eliquis 10/8/24 so closer to mid-November.     Please call his wife Charlotte at [phone].    Thank you.    For the cardioversion procedure:  -Nothing to eat or drink 8hrs before  -Take all medications with a small sip of water  -OK to hold your torsemide that morning  -You will need someone to drive you home

## 2024-10-22 NOTE — PATIENT INSTRUCTIONS
Take Keflex 500mg twice a day for 7 days.    Begin Entresto 24-26mg twice a day.    Call the office for any new, worsening, or concerning symptoms.    For the cardioversion procedure:  -Nothing to eat or drink 8hrs before  -Take all medications with a small sip of water  -OK to hold your torsemide that morning  -You will need someone to drive you home

## 2024-10-28 RX ORDER — SPIRONOLACTONE 25 MG/1
12.5 TABLET ORAL EVERY OTHER DAY
Qty: 30 TABLET | Refills: 0 | Status: SHIPPED | OUTPATIENT
Start: 2024-10-28

## 2024-10-28 NOTE — TELEPHONE ENCOUNTER
Medication Refill    Medication needing refilled:  spironolactone     Dosage of the medication: 25mg    How are you taking this medication (QD, BID, TID, QID, PRN):  Take 0.5 tablets by mouth every other day     30 or 90 day supply called in: 8 tablets     When will you run out of your medication:    Which Pharmacy are we sending the medication to?:    [facility] [id number]  [address] [facility] [address]  Phone: [phone]  Fax: [phone]

## 2024-10-28 NOTE — TELEPHONE ENCOUNTER
Pt's wife is checking to make sure the script will be sent to [facility] and not the original pharmacy.    Please advise when it is sent.

## 2024-10-28 NOTE — TELEPHONE ENCOUNTER
Refill request   spironolactone (ALDACTONE) 25 MG      Last OV:10/22/24 LES    Last Lab:10/21/24 BMP    Next Appt:11/19/24 LES

## 2024-10-30 ENCOUNTER — HOSPITAL ENCOUNTER (OUTPATIENT)
Age: 69
Discharge: HOME | End: 2024-10-30
Payer: MEDICARE

## 2024-10-30 ENCOUNTER — TELEPHONE (OUTPATIENT)
Dept: CARDIOLOGY CLINIC | Age: 69
End: 2024-10-30

## 2024-10-30 LAB
ALBUMIN SERPL-MCNC: 4.4 G/DL (ref 3.4–5)
ALBUMIN/GLOB SERPL: 1.3 {RATIO} (ref 1.1–2.2)
ALP SERPL-CCNC: 113 U/L (ref 40–129)
ALT SERPL-CCNC: 34 U/L (ref 10–40)
ANION GAP SERPL CALCULATED.3IONS-SCNC: 13 MMOL/L (ref 3–16)
AST SERPL-CCNC: 38 U/L (ref 15–37)
BILIRUB SERPL-MCNC: 0.9 MG/DL (ref 0–1)
BILIRUB UR QL STRIP.AUTO: NEGATIVE
BUN SERPL-MCNC: 32 MG/DL (ref 7–20)
CALCIUM SERPL-MCNC: 9.4 MG/DL (ref 8.3–10.6)
CHLORIDE SERPL-SCNC: 102 MMOL/L (ref 99–110)
CLARITY UR: CLEAR
CO2 SERPL-SCNC: 22 MMOL/L (ref 21–32)
COLOR UR: YELLOW
CREAT SERPL-MCNC: 1.3 MG/DL (ref 0.8–1.3)
CREAT UR-MCNC: 33.6 MG/DL (ref 39–259)
GFR SERPLBLD CREATININE-BSD FMLA CKD-EPI: 60 ML/MIN/{1.73_M2}
GLUCOSE SERPL-MCNC: 147 MG/DL (ref 70–99)
GLUCOSE UR STRIP.AUTO-MCNC: 500 MG/DL
HGB UR QL STRIP.AUTO: NEGATIVE
KETONES UR STRIP.AUTO-MCNC: NEGATIVE MG/DL
LEUKOCYTE ESTERASE UR QL STRIP.AUTO: NEGATIVE
NITRITE UR QL STRIP.AUTO: NEGATIVE
PH UR STRIP.AUTO: 6.5 [PH] (ref 5–8)
PHOSPHATE SERPL-MCNC: 3.5 MG/DL (ref 2.5–4.9)
POTASSIUM SERPL-SCNC: 4.2 MMOL/L (ref 3.5–5.1)
PROT SERPL-MCNC: 7.7 G/DL (ref 6.4–8.2)
PROT UR STRIP.AUTO-MCNC: NEGATIVE MG/DL
PROT UR-MCNC: 6.94 MG/DL
PROT/CREAT UR-RTO: 0.2 MG/DL
PTH-INTACT SERPL-MCNC: 70.1 PG/ML (ref 14–72)
SODIUM SERPL-SCNC: 137 MMOL/L (ref 136–145)
SP GR UR STRIP.AUTO: 1.01 (ref 1–1.03)
UA DIPSTICK W REFLEX MICRO PNL UR: ABNORMAL
URN SPEC COLLECT METH UR: ABNORMAL
UROBILINOGEN UR STRIP-ACNC: 0.2 E.U./DL

## 2024-10-30 PROCEDURE — 83970 ASSAY OF PARATHORMONE: CPT

## 2024-10-30 PROCEDURE — 84100 ASSAY OF PHOSPHORUS: CPT

## 2024-10-30 PROCEDURE — 36415 COLL VENOUS BLD VENIPUNCTURE: CPT

## 2024-10-30 PROCEDURE — 81003 URINALYSIS AUTO W/O SCOPE: CPT

## 2024-10-30 PROCEDURE — 80053 COMPREHEN METABOLIC PANEL: CPT

## 2024-10-30 PROCEDURE — 84156 ASSAY OF PROTEIN URINE: CPT

## 2024-10-30 PROCEDURE — 82570 ASSAY OF URINE CREATININE: CPT

## 2024-10-30 NOTE — TELEPHONE ENCOUNTER
Vicki, patient and wife stopped in the office wanting to know when his procedure will be scheduled.  She hasn't heard from anyone yet.  Could you please call and advise?  Thanks so much!

## 2024-11-04 PROBLEM — Z72.0 TOBACCO ABUSE: Status: ACTIVE | Noted: 2024-11-04

## 2024-11-04 NOTE — TELEPHONE ENCOUNTER
Called patient to schedule his procedure and mail box is full and can't leave a message. Will try again.

## 2024-11-04 NOTE — PROGRESS NOTES
[facility]   Cardiac f/up    Referring Provider:  Abraham Boothe DO     Chief Complaint   Patient presents with    Congestive Heart Failure     No cardiac symptoms present.     Follow-up      History of Present Illness:  Mr. Garcia is a 68 y.o. male who has a history of coronary artery disease s/p multi-stenting 9/2024, systolic congestive heart failure, cardiomyopathy,  hypertension, and hyperlipidemia. I saw him back in 2011 at which time he underwent a cardiac catheterization for chest pain. On presentation, he had a moderate LAD lesion and moderate diagonal lesion. He was advised to follow up regularly with me, but never sought medical attention after that. He is a former smoker.     -In April 2024, he had about a 3- to 4-day episode where he was extremely short of breath; he was experiencing discomfort in his chest. At that time, he did not seek medical attention. Since that time, he had been progressively more short of breath.   -8/19/24, his dyspnea acutely worsened with marked shortness of breath as well as lower extremity swelling. For that reason, he came to the emergency room. Chest x-ray and CT scan were consistent with acute congestive heart failure. Lab work showed his proBNP to be 16,000 with troponin high sensitivity being 34. He also was noted to be hypertensive.   -He was admitted to [facility] same day with acute chf, found to have severely reduced EF, and golbal hypokinesis. Cardiology was consulted, and he underwent a LHC that showed multivessel disease. Diuresed well with iv lasix and started on GDMT with asa,b blocker, jardiance but no ace/arb or anri d/t low/borderline BP. Had di/w both cardio and CT sx about staged pci vs cabg. Pt initially agreed on pci but later refused any intervention despite detailed discussion about high possibility of morbidity and mortality. Life vest was offered but refused that as well despite clear discussion of risk of fatal arrythmias with

## 2024-11-05 ENCOUNTER — TELEPHONE (OUTPATIENT)
Dept: CARDIOLOGY CLINIC | Age: 69
End: 2024-11-05

## 2024-11-05 NOTE — TELEPHONE ENCOUNTER
Pt wife called has some questions about the Arterial dopplers LE, The insurance sent a letter mentioning taking grafts, pt is wanting to know what that means?    Pls advise

## 2024-11-06 RX ORDER — TORSEMIDE 20 MG/1
50 TABLET ORAL DAILY
Qty: 75 TABLET | Refills: 0 | Status: SHIPPED | OUTPATIENT
Start: 2024-11-06

## 2024-11-06 NOTE — TELEPHONE ENCOUNTER
Medication Refill    Medication needing refilled:  torsemide (DEMADEX)   Dosage of the medication:  20 MG tablet   How are you taking this medication (QD, BID, TID, QID, PRN):  Take 2.5 tablets by mouth daily   30 or 90 day supply called in:  90 day supply  When will you run out of your medication:  3 days left  Which Pharmacy are we sending the medication to?:  [facility] [id number] [facility] [address] - [place] [phone] - F [phone]

## 2024-11-06 NOTE — TELEPHONE ENCOUNTER
Received ref[facility] for [facility]ide [facility].    Last ov: 10/22/2024 LES    Last Refill: 10/12/2024     Next appointment: 11/19/2024 CHRISTINE

## 2024-11-06 NOTE — TELEPHONE ENCOUNTER
Called patient and lmom for him to call back to get him scheduled for his procedure. Will try again.

## 2024-11-07 NOTE — TELEPHONE ENCOUNTER
Date Of Procedure:  11/13/24    Time Of Arrival:   12:30pm     Procedure Time: 1:30pm        Called and spoke to patient's wife Charlotte.  Theyare both agreeable to the date and time. Reviewed the Pre-Procedure instructions and they verbalized understanding. Encouraged to call with any questions or concerns.       Published on [employer] / emailed to Cath lab / note in chart / scheduled in Epic/Cupid/Carto

## 2024-11-08 ENCOUNTER — TELEPHONE (OUTPATIENT)
Dept: CARDIOLOGY CLINIC | Age: 69
End: 2024-11-08

## 2024-11-08 NOTE — TELEPHONE ENCOUNTER
Medication Refill    Medication needing refilled:  apixaban (ELIQUIS)   Dosage of the medication:  5 MG TABS tablet   How are you taking this medication (QD, BID, TID, QID, PRN):    30 or 90 day supply called in:    When will you run out of your medication:    Which Pharmacy are we sending the medication to?:  [facility] [id number] - [facility] [address] -  [phone] - F [phone]  [facility] [address] [facility] [address]  Phone: [phone]  Fax: [phone]

## 2024-11-13 ENCOUNTER — HOSPITAL ENCOUNTER (OUTPATIENT)
Age: 69
Discharge: HOME | End: 2024-11-15
Attending: INTERNAL MEDICINE
Payer: MEDICARE

## 2024-11-13 VITALS
RESPIRATION RATE: 19 BRPM | SYSTOLIC BLOOD PRESSURE: 111 MMHG | BODY MASS INDEX: 24.08 KG/M2 | TEMPERATURE: 98.8 F | DIASTOLIC BLOOD PRESSURE: 82 MMHG | OXYGEN SATURATION: 99 % | HEART RATE: 99 BPM | HEIGHT: 71 IN | WEIGHT: 172 LBS

## 2024-11-13 DIAGNOSIS — I48.19 PERSISTENT ATRIAL FIBRILLATION (HCC): ICD-10-CM

## 2024-11-13 LAB
EKG ATRIAL RATE: 286 BPM
EKG ATRIAL RATE: 97 BPM
EKG DIAGNOSIS: NORMAL
EKG DIAGNOSIS: NORMAL
EKG P AXIS: 54 DEGREES
EKG P-R INTERVAL: 174 MS
EKG Q-T INTERVAL: 358 MS
EKG Q-T INTERVAL: 382 MS
EKG QRS DURATION: 108 MS
EKG QRS DURATION: 110 MS
EKG QTC CALCULATION (BAZETT): 485 MS
EKG QTC CALCULATION (BAZETT): 495 MS
EKG R AXIS: -43 DEGREES
EKG R AXIS: -47 DEGREES
EKG T AXIS: 83 DEGREES
EKG T AXIS: 96 DEGREES
EKG VENTRICULAR RATE: 115 BPM
EKG VENTRICULAR RATE: 97 BPM

## 2024-11-13 PROCEDURE — 7100000011 HC PHASE II RECOVERY - ADDTL 15 MIN: Performed by: INTERNAL MEDICINE

## 2024-11-13 PROCEDURE — 2500000003 HC RX 250 WO HCPCS: Performed by: INTERNAL MEDICINE

## 2024-11-13 PROCEDURE — 7100000010 HC PHASE II RECOVERY - FIRST 15 MIN: Performed by: INTERNAL MEDICINE

## 2024-11-13 RX ORDER — ACETAMINOPHEN 325 MG/1
650 TABLET ORAL EVERY 4 HOURS PRN
Status: DISCONTINUED | OUTPATIENT
Start: 2024-11-13 | End: 2024-11-16 | Stop reason: HOSPADM

## 2024-11-13 RX ORDER — SODIUM CHLORIDE 9 MG/ML
INJECTION, SOLUTION INTRAVENOUS PRN
Status: DISCONTINUED | OUTPATIENT
Start: 2024-11-13 | End: 2024-11-16 | Stop reason: HOSPADM

## 2024-11-13 RX ORDER — SODIUM CHLORIDE 0.9 % (FLUSH) 0.9 %
5-40 SYRINGE (ML) INJECTION EVERY 12 HOURS SCHEDULED
Status: DISCONTINUED | OUTPATIENT
Start: 2024-11-13 | End: 2024-11-16 | Stop reason: HOSPADM

## 2024-11-13 RX ORDER — SODIUM CHLORIDE 0.9 % (FLUSH) 0.9 %
5-40 SYRINGE (ML) INJECTION PRN
Status: DISCONTINUED | OUTPATIENT
Start: 2024-11-13 | End: 2024-11-16 | Stop reason: HOSPADM

## 2024-11-13 RX ADMIN — METHOHEXITAL SODIUM 60 MG: 500 INJECTION, POWDER, LYOPHILIZED, FOR SOLUTION INTRAMUSCULAR; INTRAVENOUS; RECTAL at 13:31

## 2024-11-13 NOTE — PROCEDURES
[facility]     Electrophysiology Procedure Note       Date of Procedure: 11/13/2024  Patient's Name: Ariel Garcia  YOB: 1955   Medical Record Number: [id number]  Procedure Performed by: Dario Enamorado MD    Procedures performed:    External Electrical cardioversion   IV sedation.     Medication: Brevital Sodium   Sedation medication was given by physician    An independent trained observer assisted in the monitoring of the patient's level of consciousness and physiological status including vital signs.     Indication of the procedure: Persistent atrial flutter     Details of procedure:   The patient was brought to the cath lab area in a fasting and non-sedated state. The risks, benefits and alternatives of the procedure were discussed with the patient. The patient opted to proceed with the procedure. Written informed consent was signed and placed in the chart.  A timeout protocol was completed to identify the patient and the procedure being performed.     Then we used brevital for sedation. 60 mg Brevital was given by me as one dose, and electrical DC cardioversion was perfomred using 200J, synchronized shock.     Patient was converted to sinus rhythm. The patient tolerated the procedure well and there were no complications.     Conclusion:   Successful external DC cardioversion of atrial flutter

## 2024-11-13 NOTE — H&P
[facility]   Cardiac f/up    Referring Provider:  Abraham Boothe DO     History of Present Illness:  Mr. Garcia has a history of coronary artery disease s/p multi-stenting 9/2024, systolic congestive heart failure, cardiomyopathy,  hypertension, and hyperlipidemia. I saw him back in 2011 at which time he underwent a cardiac catheterization for chest pain. On presentation, he had a moderate LAD lesion and moderate diagonal lesion. He was advised to follow up regularly with me, but never sought medical attention after that. He is a former smoker.     -In April 2024, he had about a 3- to 4-day episode where he was extremely short of breath; he was experiencing discomfort in his chest. At that time, he did not seek medical attention. Since that time, he had been progressively more short of breath.   -8/19/24, his dyspnea acutely worsened with marked shortness of breath as well as lower extremity swelling. For that reason, he came to the emergency room. Chest x-ray and CT scan were consistent with acute congestive heart failure. Lab work showed his proBNP to be 16,000 with troponin high sensitivity being 34. He also was noted to be hypertensive.   -He was admitted to [facility] same day with acute chf, found to have severely reduced EF, and golbal hypokinesis. Cardiology was consulted, and he underwent a LHC that showed multivessel disease. Diuresed well with iv lasix and started on GDMT with asa,b blocker, jardiance but no ace/arb or anri d/t low/borderline BP. Had di/w both cardio and CT sx about staged pci vs cabg. Pt initially agreed on pci but later refused any intervention despite detailed discussion about high possibility of morbidity and mortality. Life vest was offered but refused that as well despite clear discussion of risk of fatal arrythmias with untreated ischemia. He was discharged 8/22/24 with planned staged LHC.    He was subsequently admitted 9/6/24 for LHC> PCI of RCA, D1, and LAD (DEXTER x3).

## 2024-11-15 ENCOUNTER — TELEPHONE (OUTPATIENT)
Dept: CARDIOLOGY CLINIC | Age: 69
End: 2024-11-15

## 2024-11-18 ENCOUNTER — TELEPHONE (OUTPATIENT)
Dept: CARDIOLOGY CLINIC | Age: 69
End: 2024-11-18

## 2024-11-18 RX ORDER — ATORVASTATIN CALCIUM 80 MG/1
80 TABLET, FILM COATED ORAL NIGHTLY
Qty: 30 TABLET | Refills: 3 | Status: SHIPPED | OUTPATIENT
Start: 2024-11-18

## 2024-11-18 NOTE — TELEPHONE ENCOUNTER
Spoke with Charlotte.    Ariel has 2 or 3 more Entresto tablets remaining.    Charlotte aware that prescriptions will be sent for atorvastatin and Entresto today and aware of upcoming apt tomorrow.

## 2024-11-18 NOTE — TELEPHONE ENCOUNTER
Pt wife Charlotte wright stating pt has appt with LES 11/19, however they will be out of the following medications:    Requesting RX to go to [facility]   atorvastatin (LIPITOR) 80 MG tablet      Requesting SAMPLES of the   sacubitril-valsartan (ENTRESTO) 24-26 MG per tablet.    Pls advise Charlotte  [phone]

## 2024-11-19 ENCOUNTER — OFFICE VISIT (OUTPATIENT)
Dept: CARDIOLOGY CLINIC | Age: 69
End: 2024-11-19
Payer: MEDICARE

## 2024-11-19 VITALS
SYSTOLIC BLOOD PRESSURE: 112 MMHG | OXYGEN SATURATION: 97 % | DIASTOLIC BLOOD PRESSURE: 80 MMHG | WEIGHT: 177 LBS | BODY MASS INDEX: 24.78 KG/M2 | HEART RATE: 101 BPM | HEIGHT: 71 IN

## 2024-11-19 DIAGNOSIS — I48.92 NEW ONSET ATRIAL FLUTTER (HCC): ICD-10-CM

## 2024-11-19 DIAGNOSIS — R06.00 DYSPNEA, UNSPECIFIED TYPE: ICD-10-CM

## 2024-11-19 DIAGNOSIS — Z72.0 TOBACCO ABUSE: ICD-10-CM

## 2024-11-19 DIAGNOSIS — Z98.62 S/P ANGIOPLASTY: ICD-10-CM

## 2024-11-19 DIAGNOSIS — I25.118 CORONARY ARTERY DISEASE OF NATIVE ARTERY OF NATIVE HEART WITH STABLE ANGINA PECTORIS (HCC): ICD-10-CM

## 2024-11-19 DIAGNOSIS — I50.23 ACUTE ON CHRONIC SYSTOLIC HEART FAILURE (HCC): Primary | ICD-10-CM

## 2024-11-19 DIAGNOSIS — I50.9 CONGESTIVE HEART FAILURE, UNSPECIFIED HF CHRONICITY, UNSPECIFIED HEART FAILURE TYPE (HCC): ICD-10-CM

## 2024-11-19 PROCEDURE — 93000 ELECTROCARDIOGRAM COMPLETE: CPT | Performed by: INTERNAL MEDICINE

## 2024-11-19 PROCEDURE — 3079F DIAST BP 80-89 MM HG: CPT | Performed by: INTERNAL MEDICINE

## 2024-11-19 PROCEDURE — 3074F SYST BP LT 130 MM HG: CPT | Performed by: INTERNAL MEDICINE

## 2024-11-19 PROCEDURE — 1123F ACP DISCUSS/DSCN MKR DOCD: CPT | Performed by: INTERNAL MEDICINE

## 2024-11-19 PROCEDURE — 1159F MED LIST DOCD IN RCRD: CPT | Performed by: INTERNAL MEDICINE

## 2024-11-19 PROCEDURE — 99214 OFFICE O/P EST MOD 30 MIN: CPT | Performed by: INTERNAL MEDICINE

## 2024-11-19 RX ORDER — TORSEMIDE 20 MG/1
TABLET ORAL
Qty: 75 TABLET | Refills: 0
Start: 2024-11-19

## 2024-11-19 NOTE — PATIENT INSTRUCTIONS
Decrease torsemide to one 20 mg tablet daily   Labs before next office visit ( CMP)  If dentist wants to hold Plavix and Eliquis, do not hold before December 13, 2024.  It would be better to pull the teeth on the medications  Continue risk factor modifications.   Call for any change in symptoms, call to report any changes in shortness of breath or development of chest pain with activity.    Follow up in 3 mos with echocardiogram

## 2024-12-09 NOTE — TELEPHONE ENCOUNTER
Received refill request for [facility]semide [facility].    Last ov: 11/19/2024 LES    Last Refill: 11/19/2024 #75 w/ 0    Next appointment: 02/25/2025 LES

## 2024-12-09 NOTE — TELEPHONE ENCOUNTER
Received refill request for Ariel Garcia  from [facility].     Last OV: 11/19/24    Next OV: 02/25/25    Last Labs: CMP 11/19/24    Last Filled: 10/28/24

## 2024-12-10 ENCOUNTER — TELEPHONE (OUTPATIENT)
Dept: CARDIOLOGY CLINIC | Age: 69
End: 2024-12-10

## 2024-12-10 RX ORDER — SPIRONOLACTONE 25 MG/1
12.5 TABLET ORAL EVERY OTHER DAY
Qty: 30 TABLET | Refills: 2 | Status: SHIPPED | OUTPATIENT
Start: 2024-12-10

## 2024-12-10 RX ORDER — TORSEMIDE 20 MG/1
TABLET ORAL
Qty: 75 TABLET | Refills: 0 | Status: SHIPPED | OUTPATIENT
Start: 2024-12-10

## 2024-12-10 NOTE — TELEPHONE ENCOUNTER
Called spoke with patients wife to verify patients medication doses.    Torsemide- 1 tablest daily 20 mg    Aldactone-  1/2 of 25 mg tablet (12.5mg) every other day

## 2024-12-16 ENCOUNTER — TELEPHONE (OUTPATIENT)
Dept: CARDIOLOGY CLINIC | Age: 69
End: 2024-12-16

## 2024-12-16 NOTE — TELEPHONE ENCOUNTER
Spoke to Pt and advised message per LES. Pt states tylenol makes him \"loopy\" and it does not work for him and was wondering if there was anything else he could take.

## 2024-12-16 NOTE — TELEPHONE ENCOUNTER
Patient called to ask if there is a certain Antibiotic he should take when he has teeth pulled.    Patient also asked if he can take tylenol with codeine.

## 2024-12-16 NOTE — TELEPHONE ENCOUNTER
Pt wife Charlotte called stating pt  needs to know take for foot pain? Can they take Asprin, Tylenol or Ibuprofen?    Pt was  in the back ground would not give detail of type of pain, redness or swollen however they did say it was tender, and would just like to know what they can take, Charlotte stated this had happened to pts foot before.    Pls advise

## 2025-02-05 PROBLEM — N18.9 CKD (CHRONIC KIDNEY DISEASE): Status: ACTIVE | Noted: 2025-02-05

## 2025-02-05 PROBLEM — I50.22 CHRONIC SYSTOLIC HF (HEART FAILURE) (HCC): Status: ACTIVE | Noted: 2024-10-06

## 2025-02-18 ENCOUNTER — HOSPITAL ENCOUNTER (OUTPATIENT)
Age: 70
Discharge: HOME | End: 2025-02-18
Payer: MEDICARE

## 2025-02-18 DIAGNOSIS — I25.118 CORONARY ARTERY DISEASE OF NATIVE ARTERY OF NATIVE HEART WITH STABLE ANGINA PECTORIS: ICD-10-CM

## 2025-02-18 DIAGNOSIS — Z72.0 TOBACCO ABUSE: ICD-10-CM

## 2025-02-18 DIAGNOSIS — I48.92 NEW ONSET ATRIAL FLUTTER (HCC): ICD-10-CM

## 2025-02-18 DIAGNOSIS — I50.9 CONGESTIVE HEART FAILURE, UNSPECIFIED HF CHRONICITY, UNSPECIFIED HEART FAILURE TYPE (HCC): ICD-10-CM

## 2025-02-18 DIAGNOSIS — I50.23 ACUTE ON CHRONIC SYSTOLIC HEART FAILURE (HCC): ICD-10-CM

## 2025-02-18 DIAGNOSIS — Z98.62 S/P ANGIOPLASTY: ICD-10-CM

## 2025-02-18 LAB
ALBUMIN SERPL-MCNC: 4.7 G/DL (ref 3.4–5)
ALBUMIN/GLOB SERPL: 1.7 {RATIO} (ref 1.1–2.2)
ALP SERPL-CCNC: 88 U/L (ref 40–129)
ALT SERPL-CCNC: 22 U/L (ref 10–40)
ANION GAP SERPL CALCULATED.3IONS-SCNC: 12 MMOL/L (ref 3–16)
AST SERPL-CCNC: 21 U/L (ref 15–37)
BILIRUB SERPL-MCNC: 0.5 MG/DL (ref 0–1)
BUN SERPL-MCNC: 26 MG/DL (ref 7–20)
CALCIUM SERPL-MCNC: 9.6 MG/DL (ref 8.3–10.6)
CHLORIDE SERPL-SCNC: 106 MMOL/L (ref 99–110)
CO2 SERPL-SCNC: 25 MMOL/L (ref 21–32)
CREAT SERPL-MCNC: 1.1 MG/DL (ref 0.8–1.3)
GFR SERPLBLD CREATININE-BSD FMLA CKD-EPI: 73 ML/MIN/{1.73_M2}
GLUCOSE SERPL-MCNC: 139 MG/DL (ref 70–99)
POTASSIUM SERPL-SCNC: 4.4 MMOL/L (ref 3.5–5.1)
PROT SERPL-MCNC: 7.5 G/DL (ref 6.4–8.2)
SODIUM SERPL-SCNC: 143 MMOL/L (ref 136–145)

## 2025-02-18 PROCEDURE — 80053 COMPREHEN METABOLIC PANEL: CPT

## 2025-02-18 PROCEDURE — 36415 COLL VENOUS BLD VENIPUNCTURE: CPT

## 2025-02-20 NOTE — PROGRESS NOTES
[facility]   Cardiac f/up    Referring Provider:  Abraham Boothe DO     No chief complaint on file.     History of Present Illness:  Mr. Garcia is a 69 y.o. male who has a history of coronary artery disease s/p multi-stenting 9/2024, systolic congestive heart failure, cardiomyopathy,  hypertension, and hyperlipidemia. I saw him back in 2011 at which time he underwent a cardiac catheterization for chest pain. On presentation, he had a moderate LAD lesion and moderate diagonal lesion. He was advised to follow up regularly with me, but never sought medical attention after that. He is a former smoker.     -In April 2024, he had about a 3- to 4-day episode where he was extremely short of breath; he was experiencing discomfort in his chest. At that time, he did not seek medical attention. Since that time, he had been progressively more short of breath.   -8/19/24, his dyspnea acutely worsened with marked shortness of breath as well as lower extremity swelling. For that reason, he came to the emergency room. Chest x-ray and CT scan were consistent with acute congestive heart failure. Lab work showed his proBNP to be 16,000 with troponin high sensitivity being 34. He also was noted to be hypertensive.   -He was admitted to [facility] same day with acute chf, found to have severely reduced EF, and global hypokinesis. Cardiology was consulted, and he underwent a LHC that showed multivessel disease. Diuresed well with iv lasix and started on GDMT with asa,b blocker, jardiance but no ace/arb or anri d/t low/borderline BP. Had di/w both cardio and CT sx about staged pci vs cabg. Pt initially agreed on pci but later refused any intervention despite detailed discussion about high possibility of morbidity and mortality. Life vest was offered but refused that as well despite clear discussion of risk of fatal arrythmias with untreated ischemia. He was discharged 8/22/24 with planned staged LHC.    He was subsequently 
daily 2/25/25 2/25/26 Yes Michael Fu MD   torsemide (DEMADEX) 20 MG tablet Take 1 tablet by mouth daily 2/25/25  Yes Michael Fu MD   metoprolol succinate (TOPROL XL) 25 MG extended release tablet Take 25 mg daily. 2/25/25  Yes Michael Fu MD   spironolactone (ALDACTONE) 25 MG tablet Take 0.5 tablets by mouth every other day 12/10/24  Yes Michael Fu MD   atorvastatin (LIPITOR) 80 MG tablet Take 1 tablet by mouth nightly 11/18/24  Yes Michael Fu MD   sacubitril-valsartan (ENTRESTO) 24-26 MG per tablet Take 1 tablet by mouth 2 times daily 11/18/24  Yes Michael Fu MD   apixaban (ELIQUIS) 5 MG TABS tablet Take 1 tablet by mouth 2 times daily 11/8/24  Yes Yesenia Huff APRN - CNP   clopidogrel (PLAVIX) 75 MG tablet Take 1 tablet by mouth daily 9/6/24  Yes Melvin Corral MD   empagliflozin (JARDIANCE) 10 MG tablet Take 1 tablet by mouth daily 8/22/24  Yes Gamaliel Edwards MD   esomeprazole (NEXIUM) 40 MG capsule Take 1 capsule by mouth every morning (before breakfast)   Yes Flaco Marquez MD   metoprolol (TOPROL-XL) 50 MG XL tablet Take 1 tablet by mouth daily. 5/29/11 5/28/12  Felisha Morris APRN - CNP        Allergies:  Patient has no known allergies.     Review of Systems:   Constitutional: there has been no unanticipated weight loss. There's been no change in energy level, sleep pattern, or activity level.     Eyes: No visual changes or diplopia. No scleral icterus.  ENT: No Headaches, hearing loss or vertigo. No mouth sores or sore throat.  Cardiovascular: Reviewed in HPI  Respiratory: No cough or wheezing, no sputum production. No hematemesis.    Gastrointestinal: No abdominal pain, appetite loss, blood in stools. No change in bowel or bladder habits.  Genitourinary: No dysuria, trouble voiding, or hematuria.  Musculoskeletal:  No gait disturbance, weakness or joint complaints.  Integumentary: No rash or pruritis.  Neurological: No headache, diplopia, change in muscle

## 2025-02-25 ENCOUNTER — OFFICE VISIT (OUTPATIENT)
Dept: CARDIOLOGY CLINIC | Age: 70
End: 2025-02-25
Attending: INTERNAL MEDICINE

## 2025-02-25 ENCOUNTER — HOSPITAL ENCOUNTER (OUTPATIENT)
Age: 70
Discharge: HOME | End: 2025-02-27
Attending: INTERNAL MEDICINE
Payer: MEDICARE

## 2025-02-25 VITALS
HEART RATE: 88 BPM | DIASTOLIC BLOOD PRESSURE: 86 MMHG | WEIGHT: 196.6 LBS | OXYGEN SATURATION: 98 % | SYSTOLIC BLOOD PRESSURE: 122 MMHG | HEIGHT: 71 IN | BODY MASS INDEX: 27.52 KG/M2

## 2025-02-25 VITALS
HEIGHT: 71 IN | DIASTOLIC BLOOD PRESSURE: 108 MMHG | WEIGHT: 177 LBS | SYSTOLIC BLOOD PRESSURE: 150 MMHG | BODY MASS INDEX: 24.78 KG/M2

## 2025-02-25 DIAGNOSIS — N18.9 CHRONIC KIDNEY DISEASE, UNSPECIFIED CKD STAGE: ICD-10-CM

## 2025-02-25 DIAGNOSIS — I50.9 CONGESTIVE HEART FAILURE, UNSPECIFIED HF CHRONICITY, UNSPECIFIED HEART FAILURE TYPE (HCC): ICD-10-CM

## 2025-02-25 DIAGNOSIS — I50.22 CHRONIC SYSTOLIC HF (HEART FAILURE) (HCC): Primary | ICD-10-CM

## 2025-02-25 DIAGNOSIS — I25.10 CORONARY ARTERY DISEASE INVOLVING NATIVE CORONARY ARTERY OF NATIVE HEART WITHOUT ANGINA PECTORIS: Chronic | ICD-10-CM

## 2025-02-25 DIAGNOSIS — R06.00 DYSPNEA, UNSPECIFIED TYPE: ICD-10-CM

## 2025-02-25 DIAGNOSIS — I48.92 ATRIAL FLUTTER, UNSPECIFIED TYPE (HCC): ICD-10-CM

## 2025-02-25 LAB
ECHO AO ASC DIAM: 3.8 CM
ECHO AO ASCENDING AORTA INDEX: 1.9 CM/M2
ECHO AO ROOT DIAM: 3.4 CM
ECHO AO ROOT INDEX: 1.7 CM/M2
ECHO AV AREA PEAK VELOCITY: 3 CM2
ECHO AV AREA VTI: 2.8 CM2
ECHO AV AREA/BSA PEAK VELOCITY: 1.5 CM2/M2
ECHO AV AREA/BSA VTI: 1.4 CM2/M2
ECHO AV MEAN GRADIENT: 3 MMHG
ECHO AV MEAN VELOCITY: 0.8 M/S
ECHO AV PEAK GRADIENT: 5 MMHG
ECHO AV PEAK VELOCITY: 1.2 M/S
ECHO AV VELOCITY RATIO: 0.83
ECHO AV VTI: 22.7 CM
ECHO BSA: 2.01 M2
ECHO EST RA PRESSURE: 3 MMHG
ECHO LA AREA 2C: 18.2 CM2
ECHO LA AREA 4C: 18.3 CM2
ECHO LA MAJOR AXIS: 6 CM
ECHO LA MINOR AXIS: 4.9 CM
ECHO LA VOL BP: 55 ML (ref 18–58)
ECHO LA VOL MOD A2C: 56 ML (ref 18–58)
ECHO LA VOL MOD A4C: 45 ML (ref 18–58)
ECHO LA VOL/BSA BIPLANE: 28 ML/M2 (ref 16–34)
ECHO LA VOLUME INDEX MOD A2C: 28 ML/M2 (ref 16–34)
ECHO LA VOLUME INDEX MOD A4C: 23 ML/M2 (ref 16–34)
ECHO LV E' LATERAL VELOCITY: 7.29 CM/S
ECHO LV E' SEPTAL VELOCITY: 4.57 CM/S
ECHO LV EDV A2C: 142 ML
ECHO LV EDV A4C: 137 ML
ECHO LV EDV INDEX A4C: 69 ML/M2
ECHO LV EDV NDEX A2C: 71 ML/M2
ECHO LV EF PHYSICIAN: 37 %
ECHO LV EJECTION FRACTION A2C: 42 %
ECHO LV EJECTION FRACTION A4C: 34 %
ECHO LV ESV A2C: 82 ML
ECHO LV ESV A4C: 91 ML
ECHO LV ESV INDEX A2C: 41 ML/M2
ECHO LV ESV INDEX A4C: 46 ML/M2
ECHO LV FRACTIONAL SHORTENING: 28 % (ref 28–44)
ECHO LV INTERNAL DIMENSION DIASTOLE INDEX: 2.7 CM/M2
ECHO LV INTERNAL DIMENSION DIASTOLIC: 5.4 CM (ref 4.2–5.9)
ECHO LV INTERNAL DIMENSION SYSTOLIC INDEX: 1.95 CM/M2
ECHO LV INTERNAL DIMENSION SYSTOLIC: 3.9 CM
ECHO LV IVSD: 1.1 CM (ref 0.6–1)
ECHO LV MASS 2D: 220.6 G (ref 88–224)
ECHO LV MASS INDEX 2D: 110.3 G/M2 (ref 49–115)
ECHO LV POSTERIOR WALL DIASTOLIC: 1 CM (ref 0.6–1)
ECHO LV RELATIVE WALL THICKNESS RATIO: 0.37
ECHO LVOT AREA: 3.5 CM2
ECHO LVOT AV VTI INDEX: 0.8
ECHO LVOT DIAM: 2.1 CM
ECHO LVOT MEAN GRADIENT: 2 MMHG
ECHO LVOT PEAK GRADIENT: 4 MMHG
ECHO LVOT PEAK VELOCITY: 1 M/S
ECHO LVOT STROKE VOLUME INDEX: 31.5 ML/M2
ECHO LVOT SV: 63 ML
ECHO LVOT VTI: 18.2 CM
ECHO PV MAX VELOCITY: 1 M/S
ECHO PV PEAK GRADIENT: 4 MMHG
ECHO RA AREA 4C: 13.7 CM2
ECHO RA END SYSTOLIC VOLUME APICAL 4 CHAMBER INDEX BSA: 15 ML/M2
ECHO RA VOLUME: 30 ML
ECHO RV BASAL DIMENSION: 3.3 CM
ECHO RV FREE WALL PEAK S': 8.2 CM/S
ECHO RV MID DIMENSION: 3.2 CM
ECHO RV TAPSE: 1.9 CM (ref 1.7–?)

## 2025-02-25 PROCEDURE — 93306 TTE W/DOPPLER COMPLETE: CPT

## 2025-02-25 RX ORDER — METOPROLOL SUCCINATE 50 MG/1
50 TABLET, EXTENDED RELEASE ORAL DAILY
Qty: 30 TABLET | Refills: 11 | COMMUNITY
Start: 2025-02-25 | End: 2026-02-25

## 2025-02-25 RX ORDER — METOPROLOL SUCCINATE 25 MG/1
TABLET, EXTENDED RELEASE ORAL
Qty: 90 TABLET | Refills: 3 | Status: SHIPPED | OUTPATIENT
Start: 2025-02-25

## 2025-02-25 RX ORDER — TORSEMIDE 20 MG/1
20 TABLET ORAL DAILY
Qty: 90 TABLET | Refills: 3 | Status: SHIPPED | OUTPATIENT
Start: 2025-02-25

## 2025-02-25 NOTE — PATIENT INSTRUCTIONS
Continue Jardiance  Take 25 mg Toprol XL daily  Take torsemide 20 mg daily  Continue risk factor modifications.   Call for any change in symptoms, call to report any changes in shortness of breath or development of chest pain with activity.    Follow up in 4 mos

## 2025-03-27 ENCOUNTER — TELEPHONE (OUTPATIENT)
Dept: CARDIOLOGY CLINIC | Age: 70
End: 2025-03-27

## 2025-03-27 RX ORDER — SACUBITRIL AND VALSARTAN 24; 26 MG/1; MG/1
1 TABLET, FILM COATED ORAL 2 TIMES DAILY
Qty: 60 TABLET | Refills: 3 | Status: SHIPPED | OUTPATIENT
Start: 2025-03-27

## 2025-03-27 NOTE — TELEPHONE ENCOUNTER
Medication Refill    Medication needing refilled:    empagliflozin (JARDIANCE)   Dosage of the medication:  10 MG tablet   How are you taking this medication (QD, BID, TID, QID, PRN):    30 or 90 day supply called in:    When will you run out of your medication:    Which Pharmacy are we sending the medication to?:  [facility] [id number] - [place], [place] - [address] - P [phone] - F [phone] [address] [place] [address] [address] [facility] [address]  Phone: [phone]  Fax: [phone]

## 2025-03-27 NOTE — TELEPHONE ENCOUNTER
Received refill request for Jardiance from [facility].    Last ov: 02/25/2025 LES    Last Refill: 08/22/2024 #90 w/ 1    Next appointment: 06/24/2025 LES

## 2025-03-27 NOTE — TELEPHONE ENCOUNTER
Last ov:25 LES  Next ov:25 LES  Last EK24  Last labs:25  Last filled:   Disp Refills Start End    sacubitril-valsartan (ENTRESTO) 24-26 MG per tablet 60 tablet 3 2024 --    Sig - Route: Take 1 tablet by mouth 2 times daily - Oral    Sent to pharmacy as: Sacubitril-Valsartan 24-26 MG Oral Tablet (ENTRESTO)    Cosign for Ordering: Accepted by Michael Fu MD on 2024  2:11 PM    E-Prescribing Status: Receipt confirmed by pharmacy (2024 12:13 PM EST)

## 2025-05-01 RX ORDER — ATORVASTATIN CALCIUM 80 MG/1
80 TABLET, FILM COATED ORAL NIGHTLY
Qty: 30 TABLET | Refills: 3 | Status: SHIPPED | OUTPATIENT
Start: 2025-05-01

## 2025-05-01 NOTE — TELEPHONE ENCOUNTER
Requested Prescriptions     Pending Prescriptions Disp Refills    atorvastatin (LIPITOR) 80 MG tablet [Pharmacy Med Name: ATORVASTATIN 80 MG TABLET] 30 tablet 3     Sig: TAKE ONE TABLET BY MOUTH ONCE NIGHTLY      Last OV:  2/25/2025 LES    Next OV: 6/24/2025 LES    Last Labs: 08/21/2024 Lipid    Last Filled: 11/18/2024 LES

## 2025-05-12 RX ORDER — APIXABAN 5 MG/1
5 TABLET, FILM COATED ORAL 2 TIMES DAILY
Qty: 180 TABLET | Refills: 1 | Status: SHIPPED | OUTPATIENT
Start: 2025-05-12

## 2025-05-12 NOTE — TELEPHONE ENCOUNTER
Requested Prescriptions     Pending Prescriptions Disp Refills    ELIQUIS 5 MG TABS tablet [Pharmacy Med Name: ELIQUIS 5 MG TABLET] 180 tablet 1     Sig: TAKE 1 TABLET BY MOUTH 2 TIMES A DAY      LAST OV: 2/25/2025 LES  NEXT OV: 6/24/2025  LES  LAST FILLED: 11/08/2024

## 2025-07-30 ENCOUNTER — TELEPHONE (OUTPATIENT)
Dept: CARDIOLOGY CLINIC | Age: 70
End: 2025-07-30

## 2025-07-30 NOTE — TELEPHONE ENCOUNTER
Pt came back to the office today and scheduled a 4 month follow-up appt with Dr. Fu at [facility] on 10/17/25.

## 2025-08-25 RX ORDER — SACUBITRIL AND VALSARTAN 24; 26 MG/1; MG/1
1 TABLET, FILM COATED ORAL 2 TIMES DAILY
Qty: 60 TABLET | Refills: 3 | Status: SHIPPED | OUTPATIENT
Start: 2025-08-25

## (undated) PROCEDURE — 4A023N7 MEASUREMENT OF CARDIAC SAMPLING AND PRESSURE, LEFT HEART, PERCUTANEOUS APPROACH: ICD-10-PCS

## (undated) DEVICE — Device: Brand: CORSAIR PRO

## (undated) DEVICE — DRAPE,ANGIO,BRACH,STERILE,38X44: Brand: MEDLINE

## (undated) DEVICE — Device: Brand: ASAHI GLADIUS MONGO14

## (undated) DEVICE — CATH LAB PACK: Brand: MEDLINE INDUSTRIES, INC.

## (undated) DEVICE — 4FR MICROPUNCTURE KIT STIFFEN

## (undated) DEVICE — CATH BLLN ANGIO 3.50X12MM NC EUPHORIA RX

## (undated) DEVICE — CATH BLLN ANGIO 4.50X12MM NC EUPHORIA RX

## (undated) DEVICE — Device: Brand: NOMOLINE™ LH ADULT NASAL CO2 CANNULA WITH O2 4M

## (undated) DEVICE — CATHETER 5FR JL4 CORDIS 100 CM

## (undated) DEVICE — RADIFOCUS GLIDEWIRE: Brand: GLIDEWIRE

## (undated) DEVICE — Device

## (undated) DEVICE — MINI TREK CORONARY DILATATION CATHETER 2.0 MM X 15 MM / RAPID-EXCHANGE: Brand: MINI TREK

## (undated) DEVICE — CATH BLLN ANGIO 3.50X12MM SC EUPHORA RX

## (undated) DEVICE — PAD, DEFIB, ADULT, RADIOTRANS, PHYSIO: Brand: MEDLINE

## (undated) DEVICE — PINNACLE INTRODUCER SHEATH: Brand: PINNACLE

## (undated) DEVICE — 260 CM J TIP WIRE .035

## (undated) DEVICE — CATH BLLN ANGIO 2.50X25MM SC EUPHORA RX

## (undated) DEVICE — CATHETER GUID EXTRA BACKUP 3.5 0.070IN 6FR 100CM VISTA BRITE TIP

## (undated) DEVICE — Device: Brand: FIELDER XT

## (undated) DEVICE — INTRODUCER SHTH 0.018 IN 4 FRX70 CM 21 GAX7 CM KT MIC VSI

## (undated) DEVICE — ANGIO-SEAL VIP VASCULAR CLOSURE DEVICE: Brand: ANGIO-SEAL

## (undated) DEVICE — GLIDESHEATH SLENDER ACCESS KIT: Brand: GLIDESHEATH SLENDER

## (undated) DEVICE — Device: Brand: ASAHI SION BLUE

## (undated) DEVICE — CATHETER DIAG L100CM DIA5.2FR 0.038IN POLYUR COR JR4 STD

## (undated) DEVICE — RUNTHROUGH NS EXTRA FLOPPY PTCA GUIDEWIRE: Brand: RUNTHROUGH

## (undated) DEVICE — TR BAND RADIAL ARTERY COMPRESSION DEVICE: Brand: TR BAND

## (undated) DEVICE — KIT AT-X65 ANGIOTOUCH HAND CONTROLLER

## (undated) DEVICE — GUIDEWIRE VASC L150CM DIA0.035IN FLX END L7CM J 3MM PTFE

## (undated) DEVICE — DEVICE VASC CLSR 5FR GRY W/ INTEGR SEAL 10ML LOK SYR

## (undated) DEVICE — CATHETER 5FR JL3.5 CORDIS 100CM

## (undated) DEVICE — CATHETER GUID 6FR L100CM GRN PTFE JR4 TRUELUMEN HYBRID